# Patient Record
Sex: FEMALE | Race: WHITE | NOT HISPANIC OR LATINO | Employment: OTHER | URBAN - METROPOLITAN AREA
[De-identification: names, ages, dates, MRNs, and addresses within clinical notes are randomized per-mention and may not be internally consistent; named-entity substitution may affect disease eponyms.]

---

## 2023-06-15 DIAGNOSIS — E03.9 HYPOTHYROIDISM, UNSPECIFIED: ICD-10-CM

## 2023-06-15 DIAGNOSIS — I10 ESSENTIAL (PRIMARY) HYPERTENSION: ICD-10-CM

## 2023-06-15 DIAGNOSIS — E11.9 TYPE 2 DIABETES MELLITUS WITHOUT COMPLICATIONS (MULTI): ICD-10-CM

## 2023-06-15 PROBLEM — M18.0 PRIMARY OSTEOARTHRITIS OF BOTH FIRST CARPOMETACARPAL JOINTS: Status: ACTIVE | Noted: 2023-06-15

## 2023-06-15 PROBLEM — J30.2 SEASONAL ALLERGIES: Status: ACTIVE | Noted: 2023-06-15

## 2023-06-15 PROBLEM — I49.1 ATRIAL PREMATURE COMPLEX: Status: ACTIVE | Noted: 2023-06-15

## 2023-06-15 PROBLEM — R42 DIZZINESS: Status: ACTIVE | Noted: 2023-06-15

## 2023-06-15 PROBLEM — E83.42 HYPOMAGNESEMIA: Status: ACTIVE | Noted: 2023-06-15

## 2023-06-15 PROBLEM — R00.2 HEART PALPITATIONS: Status: ACTIVE | Noted: 2023-06-15

## 2023-06-15 PROBLEM — M65.342 TRIGGER RING FINGER OF LEFT HAND: Status: ACTIVE | Noted: 2023-06-15

## 2023-06-15 PROBLEM — M94.9 DISORDER OF BONE AND CARTILAGE: Status: ACTIVE | Noted: 2023-06-15

## 2023-06-15 PROBLEM — E66.9 OBESITY (BMI 30-39.9): Status: ACTIVE | Noted: 2023-06-15

## 2023-06-15 PROBLEM — G47.30 SLEEP APNEA: Status: ACTIVE | Noted: 2023-06-15

## 2023-06-15 PROBLEM — R74.8 ELEVATED LIVER ENZYMES: Status: ACTIVE | Noted: 2023-06-15

## 2023-06-15 PROBLEM — M89.9 DISORDER OF BONE AND CARTILAGE: Status: ACTIVE | Noted: 2023-06-15

## 2023-06-15 PROBLEM — M17.0 OSTEOARTHRITIS OF BOTH KNEES: Status: ACTIVE | Noted: 2023-06-15

## 2023-06-15 RX ORDER — METOPROLOL TARTRATE 50 MG/1
TABLET ORAL
Qty: 180 TABLET | Refills: 1 | Status: SHIPPED | OUTPATIENT
Start: 2023-06-15 | End: 2023-08-21 | Stop reason: SDUPTHER

## 2023-06-15 RX ORDER — LEVOTHYROXINE SODIUM 137 UG/1
1 TABLET ORAL DAILY
COMMUNITY
Start: 2017-04-06 | End: 2023-06-15 | Stop reason: SDUPTHER

## 2023-06-15 RX ORDER — GLIMEPIRIDE 1 MG/1
1 TABLET ORAL
COMMUNITY
Start: 2021-11-09 | End: 2023-06-15 | Stop reason: SDUPTHER

## 2023-06-15 RX ORDER — METOPROLOL TARTRATE 50 MG/1
1 TABLET ORAL 2 TIMES DAILY
COMMUNITY
Start: 2019-11-04 | End: 2023-06-15 | Stop reason: SDUPTHER

## 2023-06-15 RX ORDER — METFORMIN HYDROCHLORIDE 1000 MG/1
TABLET ORAL 2 TIMES DAILY
COMMUNITY
Start: 2015-06-05 | End: 2023-06-15 | Stop reason: SDUPTHER

## 2023-06-15 RX ORDER — AZELASTINE 1 MG/ML
SPRAY, METERED NASAL
COMMUNITY
End: 2023-08-21 | Stop reason: SDUPTHER

## 2023-06-15 RX ORDER — PHENOL 1.4 %
1 AEROSOL, SPRAY (ML) MUCOUS MEMBRANE DAILY
COMMUNITY
Start: 2019-04-24

## 2023-06-15 RX ORDER — LISINOPRIL AND HYDROCHLOROTHIAZIDE 12.5; 2 MG/1; MG/1
TABLET ORAL
Qty: 180 TABLET | Refills: 1 | Status: SHIPPED | OUTPATIENT
Start: 2023-06-15 | End: 2023-08-21 | Stop reason: SDUPTHER

## 2023-06-15 RX ORDER — ATORVASTATIN CALCIUM 40 MG/1
TABLET, FILM COATED ORAL
Qty: 90 TABLET | Refills: 1 | Status: SHIPPED | OUTPATIENT
Start: 2023-06-15 | End: 2023-08-21 | Stop reason: SDUPTHER

## 2023-06-15 RX ORDER — METFORMIN HYDROCHLORIDE 1000 MG/1
TABLET ORAL
Qty: 180 TABLET | Refills: 1 | Status: SHIPPED | OUTPATIENT
Start: 2023-06-15 | End: 2023-08-21 | Stop reason: SDUPTHER

## 2023-06-15 RX ORDER — BLOOD SUGAR DIAGNOSTIC
STRIP MISCELLANEOUS 2 TIMES DAILY
COMMUNITY
Start: 2021-02-08 | End: 2023-08-21 | Stop reason: ALTCHOICE

## 2023-06-15 RX ORDER — GLIMEPIRIDE 1 MG/1
TABLET ORAL
Qty: 90 TABLET | Refills: 1 | Status: SHIPPED | OUTPATIENT
Start: 2023-06-15 | End: 2023-08-21 | Stop reason: SDUPTHER

## 2023-06-15 RX ORDER — LEVOTHYROXINE SODIUM 137 UG/1
TABLET ORAL
Qty: 90 TABLET | Refills: 1 | Status: SHIPPED | OUTPATIENT
Start: 2023-06-15 | End: 2023-08-21 | Stop reason: SDUPTHER

## 2023-06-15 RX ORDER — LANCETS 33 GAUGE
EACH MISCELLANEOUS
COMMUNITY
Start: 2023-05-17 | End: 2023-08-21 | Stop reason: SDUPTHER

## 2023-06-15 RX ORDER — LISINOPRIL AND HYDROCHLOROTHIAZIDE 12.5; 2 MG/1; MG/1
1 TABLET ORAL 2 TIMES DAILY
COMMUNITY
Start: 2016-08-09 | End: 2023-06-15 | Stop reason: SDUPTHER

## 2023-06-15 RX ORDER — ATORVASTATIN CALCIUM 40 MG/1
1 TABLET, FILM COATED ORAL DAILY
COMMUNITY
Start: 2019-04-24 | End: 2023-06-15 | Stop reason: SDUPTHER

## 2023-08-17 PROBLEM — U07.1 COVID-19 VIRUS DETECTED: Status: ACTIVE | Noted: 2023-08-17

## 2023-08-17 PROBLEM — R55 NEAR SYNCOPE: Status: ACTIVE | Noted: 2023-08-17

## 2023-08-17 PROBLEM — R49.0 HOARSENESS OF VOICE: Status: ACTIVE | Noted: 2023-08-17

## 2023-08-17 PROBLEM — J40 BRONCHITIS: Status: ACTIVE | Noted: 2023-08-17

## 2023-08-17 PROBLEM — E83.42 LOW SERUM MAGNESIUM LEVEL: Status: ACTIVE | Noted: 2023-08-17

## 2023-08-17 PROBLEM — E78.5 HYPERLIPIDEMIA: Status: ACTIVE | Noted: 2023-08-17

## 2023-08-17 PROBLEM — R05.9 COUGH: Status: ACTIVE | Noted: 2023-08-17

## 2023-08-17 PROBLEM — L03.012 PARONYCHIA OF FINGER OF LEFT HAND: Status: ACTIVE | Noted: 2023-08-17

## 2023-08-17 RX ORDER — DOXYCYCLINE HYCLATE 100 MG
1 TABLET ORAL 2 TIMES DAILY
COMMUNITY
Start: 2023-06-19 | End: 2023-08-21 | Stop reason: ALTCHOICE

## 2023-08-21 ENCOUNTER — OFFICE VISIT (OUTPATIENT)
Dept: PRIMARY CARE | Facility: CLINIC | Age: 68
End: 2023-08-21
Payer: MEDICARE

## 2023-08-21 VITALS
WEIGHT: 242 LBS | RESPIRATION RATE: 16 BRPM | TEMPERATURE: 97.4 F | SYSTOLIC BLOOD PRESSURE: 172 MMHG | HEART RATE: 78 BPM | DIASTOLIC BLOOD PRESSURE: 68 MMHG | BODY MASS INDEX: 40.9 KG/M2 | OXYGEN SATURATION: 99 %

## 2023-08-21 DIAGNOSIS — E11.9 TYPE 2 DIABETES MELLITUS WITHOUT COMPLICATIONS (MULTI): ICD-10-CM

## 2023-08-21 DIAGNOSIS — J30.2 SEASONAL ALLERGIES: Primary | ICD-10-CM

## 2023-08-21 DIAGNOSIS — I10 PRIMARY HYPERTENSION: ICD-10-CM

## 2023-08-21 DIAGNOSIS — E11.9 TYPE 2 DIABETES MELLITUS WITHOUT COMPLICATION, WITHOUT LONG-TERM CURRENT USE OF INSULIN (MULTI): ICD-10-CM

## 2023-08-21 DIAGNOSIS — Z12.31 BREAST CANCER SCREENING BY MAMMOGRAM: ICD-10-CM

## 2023-08-21 DIAGNOSIS — I10 ESSENTIAL (PRIMARY) HYPERTENSION: ICD-10-CM

## 2023-08-21 DIAGNOSIS — E03.9 HYPOTHYROIDISM, UNSPECIFIED: ICD-10-CM

## 2023-08-21 PROBLEM — R00.2 HEART PALPITATIONS: Status: RESOLVED | Noted: 2023-06-15 | Resolved: 2023-08-21

## 2023-08-21 PROBLEM — R49.0 HOARSENESS OF VOICE: Status: RESOLVED | Noted: 2023-08-17 | Resolved: 2023-08-21

## 2023-08-21 PROCEDURE — 1160F RVW MEDS BY RX/DR IN RCRD: CPT

## 2023-08-21 PROCEDURE — 3044F HG A1C LEVEL LT 7.0%: CPT

## 2023-08-21 PROCEDURE — 3077F SYST BP >= 140 MM HG: CPT

## 2023-08-21 PROCEDURE — 1126F AMNT PAIN NOTED NONE PRSNT: CPT

## 2023-08-21 PROCEDURE — 3078F DIAST BP <80 MM HG: CPT

## 2023-08-21 PROCEDURE — 1036F TOBACCO NON-USER: CPT

## 2023-08-21 PROCEDURE — 1159F MED LIST DOCD IN RCRD: CPT

## 2023-08-21 PROCEDURE — 99214 OFFICE O/P EST MOD 30 MIN: CPT

## 2023-08-21 RX ORDER — GLIMEPIRIDE 1 MG/1
1 TABLET ORAL
Qty: 100 TABLET | Refills: 3 | Status: SHIPPED | OUTPATIENT
Start: 2023-08-21

## 2023-08-21 RX ORDER — LISINOPRIL AND HYDROCHLOROTHIAZIDE 12.5; 2 MG/1; MG/1
1 TABLET ORAL 2 TIMES DAILY
Qty: 200 TABLET | Refills: 3 | Status: SHIPPED | OUTPATIENT
Start: 2023-08-21

## 2023-08-21 RX ORDER — METFORMIN HYDROCHLORIDE 1000 MG/1
1000 TABLET ORAL 2 TIMES DAILY
Qty: 200 TABLET | Refills: 1 | Status: SHIPPED | OUTPATIENT
Start: 2023-08-21 | End: 2024-01-21

## 2023-08-21 RX ORDER — ATORVASTATIN CALCIUM 40 MG/1
40 TABLET, FILM COATED ORAL DAILY
Qty: 90 TABLET | Refills: 1 | Status: SHIPPED | OUTPATIENT
Start: 2023-08-21 | End: 2024-01-08

## 2023-08-21 RX ORDER — METOPROLOL TARTRATE 50 MG/1
50 TABLET ORAL 2 TIMES DAILY
Qty: 200 TABLET | Refills: 3 | Status: SHIPPED | OUTPATIENT
Start: 2023-08-21

## 2023-08-21 RX ORDER — LANCETS 33 GAUGE
EACH MISCELLANEOUS
Qty: 100 EACH | Refills: 3 | Status: SHIPPED | OUTPATIENT
Start: 2023-08-21 | End: 2024-01-21

## 2023-08-21 RX ORDER — LEVOTHYROXINE SODIUM 137 UG/1
137 TABLET ORAL DAILY
Qty: 100 TABLET | Refills: 3 | Status: SHIPPED | OUTPATIENT
Start: 2023-08-21

## 2023-08-21 RX ORDER — AZELASTINE 1 MG/ML
SPRAY, METERED NASAL
Qty: 30 ML | Refills: 11 | Status: SHIPPED | OUTPATIENT
Start: 2023-08-21

## 2023-08-21 SDOH — ECONOMIC STABILITY: FOOD INSECURITY: WITHIN THE PAST 12 MONTHS, YOU WORRIED THAT YOUR FOOD WOULD RUN OUT BEFORE YOU GOT MONEY TO BUY MORE.: NEVER TRUE

## 2023-08-21 SDOH — ECONOMIC STABILITY: FOOD INSECURITY: WITHIN THE PAST 12 MONTHS, THE FOOD YOU BOUGHT JUST DIDN'T LAST AND YOU DIDN'T HAVE MONEY TO GET MORE.: NEVER TRUE

## 2023-08-21 ASSESSMENT — ENCOUNTER SYMPTOMS
NEUROLOGICAL NEGATIVE: 1
DEPRESSION: 0
LOSS OF SENSATION IN FEET: 0
GASTROINTESTINAL NEGATIVE: 1
MUSCULOSKELETAL NEGATIVE: 1
PSYCHIATRIC NEGATIVE: 1
ENDOCRINE NEGATIVE: 1
HEMATOLOGIC/LYMPHATIC NEGATIVE: 1
RESPIRATORY NEGATIVE: 1
CONSTITUTIONAL NEGATIVE: 1
EYES NEGATIVE: 1
OCCASIONAL FEELINGS OF UNSTEADINESS: 0
CARDIOVASCULAR NEGATIVE: 1

## 2023-08-21 ASSESSMENT — PATIENT HEALTH QUESTIONNAIRE - PHQ9
2. FEELING DOWN, DEPRESSED OR HOPELESS: NOT AT ALL
SUM OF ALL RESPONSES TO PHQ9 QUESTIONS 1 & 2: 0
1. LITTLE INTEREST OR PLEASURE IN DOING THINGS: NOT AT ALL

## 2023-08-21 ASSESSMENT — LIFESTYLE VARIABLES
HOW OFTEN DO YOU HAVE SIX OR MORE DRINKS ON ONE OCCASION: NEVER
AUDIT-C TOTAL SCORE: 1
HOW OFTEN DO YOU HAVE A DRINK CONTAINING ALCOHOL: MONTHLY OR LESS
HOW MANY STANDARD DRINKS CONTAINING ALCOHOL DO YOU HAVE ON A TYPICAL DAY: 1 OR 2
SKIP TO QUESTIONS 9-10: 1

## 2023-08-21 ASSESSMENT — PAIN SCALES - GENERAL: PAINLEVEL: 0-NO PAIN

## 2023-08-21 NOTE — ASSESSMENT & PLAN NOTE
Blood pressure extremely elevated today at 192/88, repeat manual reading of   Home blood pressures running 120-130's/70-80's, last checked last week     Continue lisinopril-hydrochlorothiazide 20-12.5 mg daily and metop tartrate 50 mg BID

## 2023-08-21 NOTE — PATIENT INSTRUCTIONS
Thank you for coming to see me today.  If you have any questions or concerns following our visit, please contact the office.  Phone: [phone]    Follow up with me in 6 months or sooner as needed    1)  Get fasting labwork in the next 1-2 weeks.  The lab is down the [address] from our office.     2) Please schedule a mammogram in November - please call [phone] or stop to 's office (in the lab office) on your way out today.     3) Increase regular exercise- if you're having trouble doing this, set a goal to walk twice per week for 10-15 minutes per session.  As time goes on increase days of the ) week and duration of exercise.  The American Heart Association recommends 150 minutes of moderate intensity exercise per week.

## 2023-08-21 NOTE — PROGRESS NOTES
Subjective   Patient ID: Alma Rosa Lr is a 68 y.o. female who presents for hypertension, hypothyroid and DM2 follow up.    Diet: Does her own cooking, not eating out much. Eating two meals per day (big breakfast, fruit/cheese in afternoon, meat/veggie/ small starch). No soda, 3 cups coffee in the morning with cream and splenda.   Exercise: Not getting much exercise. Does a lot of housework  Weight: Stable over the last year  Water: Drinking about 2-3 large glasses per day  Sleep: Good sleep, getting about 8-9 hours per night, using CPAP nightly  Social: , lives in a ranch home with walkout basement- no issues with stairs. 2 adult children, daughter and son-in-law live across the street, other son and daughter in law live in [place]. 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    HENT: Negative.     Eyes: Negative.    Respiratory: Negative.     Cardiovascular: Negative.    Gastrointestinal: Negative.    Endocrine: Negative.    Genitourinary: Negative.    Musculoskeletal: Negative.    Skin: Negative.    Neurological: Negative.    Hematological: Negative.    Psychiatric/Behavioral: Negative.          Current Outpatient Medications   Medication Sig Dispense Refill    multivitamin-min-iron-FA-vit K (Adults Multivitamin) 18 mg iron-400 mcg-25 mcg tablet Take 1 tablet by mouth once daily.      atorvastatin (Lipitor) 40 mg tablet Take 1 tablet (40 mg) by mouth once daily. 90 tablet 1    azelastine (Astelin) 137 mcg (0.1 %) nasal spray USE 2 SPRAYS IN EACH NOSTRIL TWICE DAILY AS NEEDED 30 mL 11    glimepiride (Amaryl) 1 mg tablet Take 1 tablet (1 mg) by mouth once daily with a meal. 100 tablet 3    levothyroxine (Synthroid, Levoxyl) 137 mcg tablet Take 1 tablet (137 mcg) by mouth once daily. as directed 100 tablet 3    lisinopriL-hydrochlorothiazide 20-12.5 mg tablet Take 1 tablet by mouth 2 times a day. 200 tablet 3    metFORMIN (Glucophage) 1,000 mg tablet Take 1 tablet  (1,000 mg) by mouth 2 times a day. 200 tablet 1    metoprolol tartrate (Lopressor) 50 mg tablet Take 1 tablet by mouth 2 times a day. 200 tablet 3    OneTouch Delica Plus Lancet 30 gauge misc TEST BLOOD SUGAR TWICE A  each 3     No current facility-administered medications for this visit.     Past Surgical History:   Procedure Laterality Date    CARPAL TUNNEL RELEASE  11/08/2016    Neuroplasty Median Nerve At Carpal Tunnel    DILATION AND CURETTAGE OF UTERUS  11/08/2016    Dilation And Curettage    OTHER SURGICAL HISTORY  02/07/2020    Root canal procedure    OTHER SURGICAL HISTORY  04/24/2019    Colonoscopy    OTHER SURGICAL HISTORY  04/24/2019    Cholecystectomy     Family History   Problem Relation Name Age of Onset    No Known Problems Mother      No Known Problems Father      Diabetes Other      Heart failure Other      Glaucoma Other      Coronary artery disease Other      Other (venous insuff.) Other        Social History     Tobacco Use    Smoking status: Never    Smokeless tobacco: Never   Vaping Use    Vaping Use: Never used   Substance Use Topics    Alcohol use: Yes    Drug use: Never        Objective     Visit Vitals  /68 (BP Location: Right arm, Patient Position: Sitting)   Pulse 78   Temp 36.3 °C (97.4 °F) (Temporal)   Resp 16   Wt 110 kg (242 lb)   SpO2 99%   BMI 40.90 kg/m²   Smoking Status Never   BSA 2.24 m²        Physical Exam  Constitutional:       Appearance: Normal appearance. She is obese.   HENT:      Head: Normocephalic and atraumatic.   Eyes:      Extraocular Movements: Extraocular movements intact.      Pupils: Pupils are equal, round, and reactive to light.   Cardiovascular:      Rate and Rhythm: Normal rate and regular rhythm.   Pulmonary:      Effort: Pulmonary effort is normal.      Breath sounds: Normal breath sounds.   Abdominal:      General: Abdomen is flat. Bowel sounds are normal.      Palpations: Abdomen is soft.   Musculoskeletal:         General: Normal range of  motion.   Neurological:      General: No focal deficit present.      Mental Status: She is alert and oriented to person, place, and time.   Psychiatric:         Mood and Affect: Mood normal.         Behavior: Behavior normal.           Assessment/Plan   Problem List Items Addressed This Visit       Diabetes mellitus (CMS/HCC)     Well controlled with A1c 6.8 in 3/2023  Continue metformin 1000mg BID and glimepiride 1mg daily         Hypertension     Blood pressure extremely elevated today at 192/88, repeat manual reading of   Home blood pressures running 120-130's/70-80's, last checked last week     Continue lisinopril-hydrochlorothiazide 20-12.5 mg daily and metop tartrate 50 mg BID         Seasonal allergies - Primary    Relevant Medications    azelastine (Astelin) 137 mcg (0.1 %) nasal spray     Other Visit Diagnoses       Type 2 diabetes mellitus without complications (CMS/HCC)        Relevant Medications    atorvastatin (Lipitor) 40 mg tablet    glimepiride (Amaryl) 1 mg tablet    metFORMIN (Glucophage) 1,000 mg tablet    OneTouch Delica Plus Lancet 30 gauge misc    Other Relevant Orders    CBC    Comprehensive Metabolic Panel    Lipid Panel    Hemoglobin A1C    Albumin , Urine Random    Follow Up In Primary Care - Established    Hypothyroidism, unspecified        Relevant Medications    levothyroxine (Synthroid, Levoxyl) 137 mcg tablet    Other Relevant Orders    TSH with reflex to Free T4 if abnormal    Follow Up In Primary Care - Established    Essential (primary) hypertension        Relevant Medications    lisinopriL-hydrochlorothiazide 20-12.5 mg tablet    metoprolol tartrate (Lopressor) 50 mg tablet    Breast cancer screening by mammogram        Relevant Orders    BI mammo bilateral screening tomosynthesis            All pertinent lab work and results were reviewed with patient.     Follow up with me in 6 months or sooner as needed    JOHN PAUL Akins-CNS

## 2023-08-30 DIAGNOSIS — E11.9 TYPE 2 DIABETES MELLITUS WITHOUT COMPLICATIONS (MULTI): ICD-10-CM

## 2023-08-31 RX ORDER — BLOOD-GLUCOSE METER
EACH MISCELLANEOUS
Qty: 100 STRIP | Refills: 5 | Status: SHIPPED | OUTPATIENT
Start: 2023-08-31

## 2023-09-08 ENCOUNTER — LAB (OUTPATIENT)
Dept: LAB | Facility: LAB | Age: 68
End: 2023-09-08
Payer: MEDICARE

## 2023-09-08 DIAGNOSIS — E11.9 TYPE 2 DIABETES MELLITUS WITHOUT COMPLICATIONS (MULTI): ICD-10-CM

## 2023-09-08 DIAGNOSIS — E03.9 HYPOTHYROIDISM, UNSPECIFIED: ICD-10-CM

## 2023-09-08 LAB
ALANINE AMINOTRANSFERASE (SGPT) (U/L) IN SER/PLAS: 50 U/L (ref 7–45)
ALBUMIN (G/DL) IN SER/PLAS: 4.3 G/DL (ref 3.4–5)
ALBUMIN (MG/L) IN URINE: 7.7 MG/L
ALBUMIN/CREATININE (UG/MG) IN URINE: 8.8 UG/MG CRT (ref 0–30)
ALKALINE PHOSPHATASE (U/L) IN SER/PLAS: 96 U/L (ref 33–136)
ANION GAP IN SER/PLAS: 11 MMOL/L (ref 10–20)
ASPARTATE AMINOTRANSFERASE (SGOT) (U/L) IN SER/PLAS: 34 U/L (ref 9–39)
BILIRUBIN TOTAL (MG/DL) IN SER/PLAS: 0.6 MG/DL (ref 0–1.2)
CALCIUM (MG/DL) IN SER/PLAS: 9.2 MG/DL (ref 8.6–10.3)
CARBON DIOXIDE, TOTAL (MMOL/L) IN SER/PLAS: 27 MMOL/L (ref 21–32)
CHLORIDE (MMOL/L) IN SER/PLAS: 102 MMOL/L (ref 98–107)
CHOLESTEROL (MG/DL) IN SER/PLAS: 130 MG/DL (ref 0–199)
CHOLESTEROL IN HDL (MG/DL) IN SER/PLAS: 47.5 MG/DL
CHOLESTEROL/HDL RATIO: 2.7
CREATININE (MG/DL) IN SER/PLAS: 0.81 MG/DL (ref 0.5–1.05)
CREATININE (MG/DL) IN URINE: 87.5 MG/DL (ref 20–320)
ERYTHROCYTE DISTRIBUTION WIDTH (RATIO) BY AUTOMATED COUNT: 12.6 % (ref 11.5–14.5)
ERYTHROCYTE MEAN CORPUSCULAR HEMOGLOBIN CONCENTRATION (G/DL) BY AUTOMATED: 33.9 G/DL (ref 32–36)
ERYTHROCYTE MEAN CORPUSCULAR VOLUME (FL) BY AUTOMATED COUNT: 99 FL (ref 80–100)
ERYTHROCYTES (10*6/UL) IN BLOOD BY AUTOMATED COUNT: 3.88 X10E12/L (ref 4–5.2)
ESTIMATED AVERAGE GLUCOSE FOR HBA1C: 157 MG/DL
GFR FEMALE: 79 ML/MIN/1.73M2
GLUCOSE (MG/DL) IN SER/PLAS: 155 MG/DL (ref 74–99)
HEMATOCRIT (%) IN BLOOD BY AUTOMATED COUNT: 38.4 % (ref 36–46)
HEMOGLOBIN (G/DL) IN BLOOD: 13 G/DL (ref 12–16)
HEMOGLOBIN A1C/HEMOGLOBIN TOTAL IN BLOOD: 7.1 %
LDL: 49 MG/DL (ref 0–99)
LEUKOCYTES (10*3/UL) IN BLOOD BY AUTOMATED COUNT: 7 X10E9/L (ref 4.4–11.3)
PLATELETS (10*3/UL) IN BLOOD AUTOMATED COUNT: 244 X10E9/L (ref 150–450)
POTASSIUM (MMOL/L) IN SER/PLAS: 4 MMOL/L (ref 3.5–5.3)
PROTEIN TOTAL: 7.2 G/DL (ref 6.4–8.2)
SODIUM (MMOL/L) IN SER/PLAS: 136 MMOL/L (ref 136–145)
THYROTROPIN (MIU/L) IN SER/PLAS BY DETECTION LIMIT <= 0.05 MIU/L: 1.81 MIU/L (ref 0.44–3.98)
TRIGLYCERIDE (MG/DL) IN SER/PLAS: 168 MG/DL (ref 0–149)
UREA NITROGEN (MG/DL) IN SER/PLAS: 20 MG/DL (ref 6–23)
VLDL: 34 MG/DL (ref 0–40)

## 2023-09-08 PROCEDURE — 85027 COMPLETE CBC AUTOMATED: CPT

## 2023-09-08 PROCEDURE — 84443 ASSAY THYROID STIM HORMONE: CPT

## 2023-09-08 PROCEDURE — 82043 UR ALBUMIN QUANTITATIVE: CPT

## 2023-09-08 PROCEDURE — 36415 COLL VENOUS BLD VENIPUNCTURE: CPT

## 2023-09-08 PROCEDURE — 80053 COMPREHEN METABOLIC PANEL: CPT

## 2023-09-08 PROCEDURE — 82570 ASSAY OF URINE CREATININE: CPT

## 2023-09-08 PROCEDURE — 80061 LIPID PANEL: CPT

## 2023-09-08 PROCEDURE — 83036 HEMOGLOBIN GLYCOSYLATED A1C: CPT

## 2023-09-19 ENCOUNTER — PATIENT MESSAGE (OUTPATIENT)
Dept: PRIMARY CARE | Facility: CLINIC | Age: 68
End: 2023-09-19
Payer: MEDICARE

## 2023-09-19 DIAGNOSIS — I10 PRIMARY HYPERTENSION: Primary | ICD-10-CM

## 2023-09-20 RX ORDER — LISINOPRIL 20 MG/1
20 TABLET ORAL DAILY
Qty: 30 TABLET | Refills: 3 | Status: SHIPPED | OUTPATIENT
Start: 2023-09-20 | End: 2023-09-22

## 2023-09-21 DIAGNOSIS — I10 PRIMARY HYPERTENSION: ICD-10-CM

## 2023-09-22 RX ORDER — LISINOPRIL 20 MG/1
TABLET ORAL
Qty: 30 TABLET | Refills: 3 | Status: SHIPPED | OUTPATIENT
Start: 2023-09-22 | End: 2024-01-21

## 2023-10-22 ENCOUNTER — TELEPHONE (OUTPATIENT)
Dept: PRIMARY CARE | Facility: CLINIC | Age: 68
End: 2023-10-22
Payer: MEDICARE

## 2023-10-22 DIAGNOSIS — U07.1 COVID-19 VIRUS INFECTION: Primary | ICD-10-CM

## 2023-10-22 NOTE — TELEPHONE ENCOUNTER
Rec'd call from on call service:   Spoke with pt, she tested positive for COVID this am; requesting Paxlovid.   We will send the Rx, paxlovid as requested below.   Adv to seek ER care if sx worsens.   Dr. Stuart

## 2023-11-13 ENCOUNTER — ANCILLARY PROCEDURE (OUTPATIENT)
Dept: RADIOLOGY | Facility: CLINIC | Age: 68
End: 2023-11-13
Payer: MEDICARE

## 2023-11-13 DIAGNOSIS — Z12.31 ENCOUNTER FOR SCREENING MAMMOGRAM FOR MALIGNANT NEOPLASM OF BREAST: ICD-10-CM

## 2023-11-13 PROCEDURE — 77063 BREAST TOMOSYNTHESIS BI: CPT

## 2023-11-13 PROCEDURE — 77067 SCR MAMMO BI INCL CAD: CPT | Mod: BILATERAL PROCEDURE | Performed by: RADIOLOGY

## 2023-11-13 PROCEDURE — 77063 BREAST TOMOSYNTHESIS BI: CPT | Mod: BILATERAL PROCEDURE | Performed by: RADIOLOGY

## 2023-11-17 ENCOUNTER — APPOINTMENT (OUTPATIENT)
Dept: PRIMARY CARE | Facility: CLINIC | Age: 68
End: 2023-11-17
Payer: MEDICARE

## 2024-01-06 DIAGNOSIS — E11.9 TYPE 2 DIABETES MELLITUS WITHOUT COMPLICATIONS (MULTI): ICD-10-CM

## 2024-01-08 RX ORDER — ALBUTEROL SULFATE 90 UG/1
2 AEROSOL, METERED RESPIRATORY (INHALATION) 4 TIMES DAILY PRN
COMMUNITY
Start: 2023-11-19 | End: 2024-03-29

## 2024-01-08 RX ORDER — HYDROCODONE BITARTRATE AND ACETAMINOPHEN 5; 325 MG/1; MG/1
1 TABLET ORAL EVERY 6 HOURS PRN
COMMUNITY
Start: 2023-12-13 | End: 2024-03-10 | Stop reason: ALTCHOICE

## 2024-01-08 RX ORDER — ATORVASTATIN CALCIUM 40 MG/1
40 TABLET, FILM COATED ORAL DAILY
Qty: 90 TABLET | Refills: 1 | Status: SHIPPED | OUTPATIENT
Start: 2024-01-08

## 2024-01-18 DIAGNOSIS — E11.9 TYPE 2 DIABETES MELLITUS WITHOUT COMPLICATIONS (MULTI): ICD-10-CM

## 2024-01-18 DIAGNOSIS — I10 PRIMARY HYPERTENSION: ICD-10-CM

## 2024-01-21 RX ORDER — METFORMIN HYDROCHLORIDE 1000 MG/1
1000 TABLET ORAL 2 TIMES DAILY
Qty: 200 TABLET | Refills: 3 | Status: SHIPPED | OUTPATIENT
Start: 2024-01-21 | End: 2024-02-26 | Stop reason: SINTOL

## 2024-01-21 RX ORDER — LISINOPRIL 20 MG/1
TABLET ORAL
Qty: 90 TABLET | Refills: 3 | Status: SHIPPED | OUTPATIENT
Start: 2024-01-21 | End: 2024-02-26 | Stop reason: ALTCHOICE

## 2024-01-21 RX ORDER — LANCETS 33 GAUGE
EACH MISCELLANEOUS
Qty: 200 EACH | Refills: 3 | Status: SHIPPED | OUTPATIENT
Start: 2024-01-21

## 2024-02-26 ENCOUNTER — OFFICE VISIT (OUTPATIENT)
Dept: PRIMARY CARE | Facility: CLINIC | Age: 69
End: 2024-02-26
Payer: MEDICARE

## 2024-02-26 VITALS
BODY MASS INDEX: 40.57 KG/M2 | HEIGHT: 65 IN | HEART RATE: 65 BPM | WEIGHT: 243.5 LBS | SYSTOLIC BLOOD PRESSURE: 149 MMHG | DIASTOLIC BLOOD PRESSURE: 82 MMHG | TEMPERATURE: 96.2 F | RESPIRATION RATE: 20 BRPM | OXYGEN SATURATION: 96 %

## 2024-02-26 DIAGNOSIS — E03.9 HYPOTHYROIDISM, UNSPECIFIED TYPE: ICD-10-CM

## 2024-02-26 DIAGNOSIS — E03.9 ACQUIRED HYPOTHYROIDISM: ICD-10-CM

## 2024-02-26 DIAGNOSIS — I10 PRIMARY HYPERTENSION: ICD-10-CM

## 2024-02-26 DIAGNOSIS — E83.42 HYPOMAGNESEMIA: ICD-10-CM

## 2024-02-26 DIAGNOSIS — E11.9 TYPE 2 DIABETES MELLITUS WITHOUT COMPLICATION, WITHOUT LONG-TERM CURRENT USE OF INSULIN (MULTI): ICD-10-CM

## 2024-02-26 DIAGNOSIS — Z78.0 POST-MENOPAUSAL: ICD-10-CM

## 2024-02-26 DIAGNOSIS — Z00.00 ENCOUNTER FOR MEDICARE ANNUAL WELLNESS EXAM: ICD-10-CM

## 2024-02-26 DIAGNOSIS — Z00.00 ROUTINE GENERAL MEDICAL EXAMINATION AT HEALTH CARE FACILITY: Primary | ICD-10-CM

## 2024-02-26 PROBLEM — R05.9 COUGH: Status: RESOLVED | Noted: 2023-08-17 | Resolved: 2024-02-26

## 2024-02-26 PROBLEM — R42 DIZZINESS: Status: RESOLVED | Noted: 2023-06-15 | Resolved: 2024-02-26

## 2024-02-26 PROBLEM — U07.1 COVID-19 VIRUS DETECTED: Status: RESOLVED | Noted: 2023-08-17 | Resolved: 2024-02-26

## 2024-02-26 PROBLEM — J40 BRONCHITIS: Status: RESOLVED | Noted: 2023-08-17 | Resolved: 2024-02-26

## 2024-02-26 PROBLEM — R55 NEAR SYNCOPE: Status: RESOLVED | Noted: 2023-08-17 | Resolved: 2024-02-26

## 2024-02-26 PROCEDURE — 1123F ACP DISCUSS/DSCN MKR DOCD: CPT

## 2024-02-26 PROCEDURE — 3077F SYST BP >= 140 MM HG: CPT

## 2024-02-26 PROCEDURE — 3078F DIAST BP <80 MM HG: CPT

## 2024-02-26 PROCEDURE — 1036F TOBACCO NON-USER: CPT

## 2024-02-26 PROCEDURE — 1126F AMNT PAIN NOTED NONE PRSNT: CPT

## 2024-02-26 PROCEDURE — 99214 OFFICE O/P EST MOD 30 MIN: CPT

## 2024-02-26 PROCEDURE — G0439 PPPS, SUBSEQ VISIT: HCPCS

## 2024-02-26 PROCEDURE — 99397 PER PM REEVAL EST PAT 65+ YR: CPT

## 2024-02-26 PROCEDURE — 1160F RVW MEDS BY RX/DR IN RCRD: CPT

## 2024-02-26 PROCEDURE — 1170F FXNL STATUS ASSESSED: CPT

## 2024-02-26 PROCEDURE — 1159F MED LIST DOCD IN RCRD: CPT

## 2024-02-26 RX ORDER — CALCIUM CARBONATE 300MG(750)
400 TABLET,CHEWABLE ORAL DAILY
COMMUNITY

## 2024-02-26 RX ORDER — METFORMIN HYDROCHLORIDE 500 MG/1
1000 TABLET, EXTENDED RELEASE ORAL
Qty: 360 TABLET | Refills: 3 | Status: SHIPPED | OUTPATIENT
Start: 2024-02-26 | End: 2025-02-25

## 2024-02-26 RX ORDER — AMLODIPINE BESYLATE 2.5 MG/1
2.5 TABLET ORAL DAILY
Qty: 30 TABLET | Refills: 5 | Status: SHIPPED | OUTPATIENT
Start: 2024-02-26 | End: 2024-04-02

## 2024-02-26 SDOH — ECONOMIC STABILITY: FOOD INSECURITY: WITHIN THE PAST 12 MONTHS, YOU WORRIED THAT YOUR FOOD WOULD RUN OUT BEFORE YOU GOT MONEY TO BUY MORE.: NEVER TRUE

## 2024-02-26 SDOH — ECONOMIC STABILITY: FOOD INSECURITY: WITHIN THE PAST 12 MONTHS, THE FOOD YOU BOUGHT JUST DIDN'T LAST AND YOU DIDN'T HAVE MONEY TO GET MORE.: NEVER TRUE

## 2024-02-26 ASSESSMENT — PATIENT HEALTH QUESTIONNAIRE - PHQ9
SUM OF ALL RESPONSES TO PHQ9 QUESTIONS 1 & 2: 0
2. FEELING DOWN, DEPRESSED OR HOPELESS: NOT AT ALL
1. LITTLE INTEREST OR PLEASURE IN DOING THINGS: NOT AT ALL

## 2024-02-26 ASSESSMENT — ACTIVITIES OF DAILY LIVING (ADL)
DOING_HOUSEWORK: INDEPENDENT
MANAGING_FINANCES: INDEPENDENT
TAKING_MEDICATION: INDEPENDENT
DRESSING: INDEPENDENT
GROCERY_SHOPPING: INDEPENDENT
BATHING: INDEPENDENT

## 2024-02-26 ASSESSMENT — LIFESTYLE VARIABLES
SKIP TO QUESTIONS 9-10: 1
HOW OFTEN DO YOU HAVE A DRINK CONTAINING ALCOHOL: 2-3 TIMES A WEEK
HOW MANY STANDARD DRINKS CONTAINING ALCOHOL DO YOU HAVE ON A TYPICAL DAY: PATIENT DOES NOT DRINK
HOW OFTEN DO YOU HAVE SIX OR MORE DRINKS ON ONE OCCASION: NEVER
AUDIT-C TOTAL SCORE: 3

## 2024-02-26 ASSESSMENT — ENCOUNTER SYMPTOMS
CARDIOVASCULAR NEGATIVE: 1
MUSCULOSKELETAL NEGATIVE: 1
CONSTITUTIONAL NEGATIVE: 1
LOSS OF SENSATION IN FEET: 0
RESPIRATORY NEGATIVE: 1
NEUROLOGICAL NEGATIVE: 1
OCCASIONAL FEELINGS OF UNSTEADINESS: 0
DEPRESSION: 0
ENDOCRINE NEGATIVE: 1
HEMATOLOGIC/LYMPHATIC NEGATIVE: 1
EYES NEGATIVE: 1
GASTROINTESTINAL NEGATIVE: 1
PSYCHIATRIC NEGATIVE: 1

## 2024-02-26 ASSESSMENT — PAIN SCALES - GENERAL: PAINLEVEL: 0-NO PAIN

## 2024-02-26 NOTE — PATIENT INSTRUCTIONS
Thank you for coming to see me today.  If you have any questions or concerns following our visit, please contact the office.  Phone: [phone]    Follow up with me in 4 months or sooner as needed    1)  Get fasting labwork in the next 1-2 weeks and again a few days prior to next appointment.  The lab is down the [address] from our office.     2) Please schedule a bone density scan - please call [phone] or schedule at  on your way out today     3) START dulaglutide 0.75mg weekly. If cost is an issue please let me know and I will refer you to clinical pharmacy for cost issues    4) STOP lisinopril 20mg daily. START amlodipine 2.5mg daily. If you notice worsening leg swelling please stop medication and let me know

## 2024-02-26 NOTE — ASSESSMENT & PLAN NOTE
Hypertensive in office at 158/72  Not checking BP's at home often    Was taking lisinopril 20mg daily in addition to lisinopril-hydrochlorothiazide 20-12.5mg twice daily.  Stop lisinopril 20mg daily, continue lisinopril-hydrochlorothiazide 20-12.5mg twice daily  Start amlodipine 2.5mg daily  Advised checking blood pressures at home, record the values and bring to next office visit

## 2024-02-26 NOTE — ASSESSMENT & PLAN NOTE
A1c from 9/2023 elevated at 7.1%  Reports persistently elevated fasting glucose readings ranging 130-150    Having diarrhea in the mornings with metformin IR. Switch to metformin XR 1000mg twice daily  Continue glimepiride 1mg daily; start dulaglutide 0.75mg weekly

## 2024-02-26 NOTE — ASSESSMENT & PLAN NOTE
Wellness screenings/Immunizations:  Flu vaccination: Recommended annually  PCV: 1/2017  PPSV: 2/2022  Shingrix vaccine: Series complete  Colon cancer screening: Cologuard 3/2022  Mammogram: 11/2023, WNL  DEXA scan: Repeat scan recommended, last scan normal in 3/2022

## 2024-02-26 NOTE — PROGRESS NOTES
Subjective   Patient ID: Alma Rosa Lr is a 69 y.o. female who presents for medicare wellness visit and follow up of HTN, DM2 and hypothyroidism.    Reports home glucose readings have been persistently elevated, has had several corticosteroids orally and injected but none for the last few weeks. Reports fasting sugars are running 130-150.     Diet: Does her own cooking, not eating out much. Eating two meals per day (big breakfast, fruit/cheese in afternoon, meat/veggie/ small starch). No soda, 3 cups coffee in the morning with cream and splenda.   Exercise: Started Silver Sneakers a few weeks ago, combination of cardio and lifting or 20 minute cardios daily, exercising 5-6 days per week. Does a lot of housework  Weight: Stable over the last year  Water: Drinking about 2-3 large glasses per day  Sleep: Good sleep, getting about 8-9 hours per night, using CPAP nightly  Social: , lives in a ranch home with walkout basement- no issues with stairs. 2 adult children, daughter and son-in-law live across the street, other son and daughter in law live in [place]. 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    HENT: Negative.     Eyes: Negative.    Respiratory: Negative.     Cardiovascular: Negative.    Gastrointestinal: Negative.    Endocrine: Negative.    Genitourinary: Negative.    Musculoskeletal: Negative.    Skin: Negative.    Neurological: Negative.    Hematological: Negative.    Psychiatric/Behavioral: Negative.          Current Outpatient Medications   Medication Sig Dispense Refill    atorvastatin (Lipitor) 40 mg tablet TAKE 1 TABLET BY MOUTH ONCE DAILY 90 tablet 1    azelastine (Astelin) 137 mcg (0.1 %) nasal spray USE 2 SPRAYS IN EACH NOSTRIL TWICE DAILY AS NEEDED 30 mL 11    glimepiride (Amaryl) 1 mg tablet Take 1 tablet (1 mg) by mouth once daily with a meal. 100 tablet 3    levothyroxine (Synthroid, Levoxyl) 137 mcg tablet Take 1 tablet (137 mcg) by mouth once  daily. as directed 100 tablet 3    lisinopriL-hydrochlorothiazide 20-12.5 mg tablet Take 1 tablet by mouth 2 times a day. 200 tablet 3    magnesium oxide (Mag-Ox) 400 mg tablet Take 1 tablet (400 mg) by mouth once daily.      metoprolol tartrate (Lopressor) 50 mg tablet Take 1 tablet by mouth 2 times a day. 200 tablet 3    multivitamin-min-iron-FA-vit K (Adults Multivitamin) 18 mg iron-400 mcg-25 mcg tablet Take 1 tablet by mouth once daily.      OneTouch Delica Plus Lancet 30 gauge misc Use to test blood sugar TWICE DAILY 200 each 3    OneTouch Verio test strips strip test twice daily AS DIRECTED 100 strip 5    albuterol 90 mcg/actuation inhaler Inhale 2 puffs 4 times a day as needed for wheezing or shortness of breath.      amLODIPine (Norvasc) 2.5 mg tablet Take 1 tablet (2.5 mg) by mouth once daily. 30 tablet 5    dulaglutide (Trulicity) 0.75 mg/0.5 mL pen injector Inject 0.75 mg under the skin 1 (one) time per week. 2 mL 11    HYDROcodone-acetaminophen (Norco) 5-325 mg tablet Take 1 tablet by mouth every 6 hours if needed for moderate pain (4 - 6) or severe pain (7 - 10).      metFORMIN XR (Glucophage-XR) 500 mg 24 hr tablet Take 2 tablets (1,000 mg) by mouth 2 times a day with meals. Do not crush, chew, or split. 360 tablet 3     No current facility-administered medications for this visit.     Past Surgical History:   Procedure Laterality Date    CARPAL TUNNEL RELEASE  11/08/2016    Neuroplasty Median Nerve At Carpal Tunnel    DILATION AND CURETTAGE OF UTERUS  11/08/2016    Dilation And Curettage    OTHER SURGICAL HISTORY  02/07/2020    Root canal procedure    OTHER SURGICAL HISTORY  04/24/2019    Colonoscopy    OTHER SURGICAL HISTORY  04/24/2019    Cholecystectomy     Family History   Problem Relation Name Age of Onset    No Known Problems Mother      No Known Problems Father      Diabetes Other      Heart failure Other      Glaucoma Other      Coronary artery disease Other      Other (venous insuff.) Other    "     Social History     Tobacco Use    Smoking status: Never     Passive exposure: Never    Smokeless tobacco: Never   Vaping Use    Vaping Use: Never used   Substance Use Topics    Alcohol use: Yes     Alcohol/week: 4.0 standard drinks of alcohol     Types: 4 Glasses of wine per week    Drug use: Never        Objective     Visit Vitals  /82 (BP Location: Right arm, Patient Position: Sitting)   Pulse 65   Temp 35.7 °C (96.2 °F)   Resp 20   Ht 1.638 m (5' 4.5\")   Wt 110 kg (243 lb 8 oz)   SpO2 96%   BMI 41.15 kg/m²   OB Status Postmenopausal   Smoking Status Never   BSA 2.24 m²        Physical Exam  Constitutional:       Appearance: Normal appearance. She is obese.   HENT:      Head: Normocephalic and atraumatic.   Eyes:      Extraocular Movements: Extraocular movements intact.      Pupils: Pupils are equal, round, and reactive to light.   Cardiovascular:      Rate and Rhythm: Normal rate and regular rhythm.   Pulmonary:      Effort: Pulmonary effort is normal.      Breath sounds: Normal breath sounds.   Abdominal:      General: Abdomen is flat. Bowel sounds are normal.      Palpations: Abdomen is soft.   Musculoskeletal:         General: Normal range of motion.   Skin:     General: Skin is warm and dry.      Capillary Refill: Capillary refill takes less than 2 seconds.   Neurological:      General: No focal deficit present.      Mental Status: She is alert and oriented to person, place, and time.   Psychiatric:         Mood and Affect: Mood normal.         Behavior: Behavior normal.           Assessment/Plan   Problem List Items Addressed This Visit       Diabetes mellitus (CMS/[facility])     A1c from 9/2023 elevated at 7.1%  Reports persistently elevated fasting glucose readings ranging 130-150    Having diarrhea in the mornings with metformin IR. Switch to metformin XR 1000mg twice daily  Continue glimepiride 1mg daily; start dulaglutide 0.75mg weekly         Relevant Medications    dulaglutide (Trulicity) 0.75 " mg/0.5 mL pen injector    metFORMIN XR (Glucophage-XR) 500 mg 24 hr tablet    Other Relevant Orders    Hemoglobin A1C    Lipid Panel    Basic Metabolic Panel    Albumin , Urine Random    Hemoglobin A1C    CBC    Comprehensive Metabolic Panel    Lipid Panel    Follow Up In Primary Care - Established    Hypomagnesemia    Relevant Orders    Magnesium    Acquired hypothyroidism     TSH from 9/2023 WNL    Repeat TSH w/ reflex to T4  Continue levothyroxine 137mcg daily         Hypertension     Hypertensive in office at 158/72  Not checking BP's at home often    Was taking lisinopril 20mg daily in addition to lisinopril-hydrochlorothiazide 20-12.5mg twice daily.  Stop lisinopril 20mg daily, continue lisinopril-hydrochlorothiazide 20-12.5mg twice daily  Start amlodipine 2.5mg daily  Advised checking blood pressures at home, record the values and bring to next office visit           Relevant Medications    amLODIPine (Norvasc) 2.5 mg tablet    Other Relevant Orders    Follow Up In Primary Care - Established    Encounter for Medicare annual wellness exam     Wellness screenings/Immunizations:  Flu vaccination: Recommended annually  PCV: 1/2017  PPSV: 2/2022  Shingrix vaccine: Series complete  Colon cancer screening: Cologuard 3/2022  Mammogram: 11/2023, WNL  DEXA scan: Repeat scan recommended, last scan normal in 3/2022            Other Visit Diagnoses       Routine general medical examination at health care facility    -  Primary    Post-menopausal        Relevant Orders    XR DEXA bone density            All pertinent lab work and results were reviewed with patient.     Follow up with me in 4 months    Christi Mitchell, JOHN PAUL-CNS

## 2024-03-06 ENCOUNTER — HOSPITAL ENCOUNTER (OUTPATIENT)
Dept: RADIOLOGY | Facility: CLINIC | Age: 69
Discharge: HOME | End: 2024-03-06
Payer: MEDICARE

## 2024-03-06 DIAGNOSIS — Z78.0 POST-MENOPAUSAL: ICD-10-CM

## 2024-03-06 PROCEDURE — 77080 DXA BONE DENSITY AXIAL: CPT

## 2024-03-06 PROCEDURE — 77080 DXA BONE DENSITY AXIAL: CPT | Performed by: RADIOLOGY

## 2024-03-08 ENCOUNTER — LAB (OUTPATIENT)
Dept: LAB | Facility: LAB | Age: 69
End: 2024-03-08
Payer: MEDICARE

## 2024-03-08 DIAGNOSIS — E83.42 HYPOMAGNESEMIA: ICD-10-CM

## 2024-03-08 DIAGNOSIS — E03.9 HYPOTHYROIDISM, UNSPECIFIED TYPE: ICD-10-CM

## 2024-03-08 DIAGNOSIS — E11.9 TYPE 2 DIABETES MELLITUS WITHOUT COMPLICATION, WITHOUT LONG-TERM CURRENT USE OF INSULIN (MULTI): ICD-10-CM

## 2024-03-08 LAB
ANION GAP SERPL CALC-SCNC: 13 MMOL/L (ref 10–20)
BUN SERPL-MCNC: 20 MG/DL (ref 6–23)
CALCIUM SERPL-MCNC: 9.6 MG/DL (ref 8.6–10.3)
CHLORIDE SERPL-SCNC: 100 MMOL/L (ref 98–107)
CHOLEST SERPL-MCNC: 132 MG/DL (ref 0–199)
CHOLESTEROL/HDL RATIO: 2.4
CO2 SERPL-SCNC: 28 MMOL/L (ref 21–32)
CREAT SERPL-MCNC: 0.89 MG/DL (ref 0.5–1.05)
EGFRCR SERPLBLD CKD-EPI 2021: 71 ML/MIN/1.73M*2
EST. AVERAGE GLUCOSE BLD GHB EST-MCNC: 174 MG/DL
GLUCOSE SERPL-MCNC: 127 MG/DL (ref 74–99)
HBA1C MFR BLD: 7.7 %
HDLC SERPL-MCNC: 54.9 MG/DL
LDLC SERPL CALC-MCNC: 55 MG/DL
MAGNESIUM SERPL-MCNC: 1.77 MG/DL (ref 1.6–2.4)
NON HDL CHOLESTEROL: 77 MG/DL (ref 0–149)
POTASSIUM SERPL-SCNC: 4.2 MMOL/L (ref 3.5–5.3)
SODIUM SERPL-SCNC: 137 MMOL/L (ref 136–145)
TRIGL SERPL-MCNC: 113 MG/DL (ref 0–149)
TSH SERPL-ACNC: 1.61 MIU/L (ref 0.44–3.98)
VLDL: 23 MG/DL (ref 0–40)

## 2024-03-08 PROCEDURE — 83735 ASSAY OF MAGNESIUM: CPT

## 2024-03-08 PROCEDURE — 84443 ASSAY THYROID STIM HORMONE: CPT

## 2024-03-08 PROCEDURE — 83036 HEMOGLOBIN GLYCOSYLATED A1C: CPT

## 2024-03-08 PROCEDURE — 80048 BASIC METABOLIC PNL TOTAL CA: CPT

## 2024-03-08 PROCEDURE — 80061 LIPID PANEL: CPT

## 2024-03-08 PROCEDURE — 36415 COLL VENOUS BLD VENIPUNCTURE: CPT

## 2024-03-10 DIAGNOSIS — Z13.29 SCREENING FOR THYROID DISORDER: ICD-10-CM

## 2024-03-10 DIAGNOSIS — E11.9 TYPE 2 DIABETES MELLITUS WITHOUT COMPLICATION, WITHOUT LONG-TERM CURRENT USE OF INSULIN (MULTI): Primary | ICD-10-CM

## 2024-03-29 DIAGNOSIS — I10 PRIMARY HYPERTENSION: ICD-10-CM

## 2024-04-01 ENCOUNTER — APPOINTMENT (OUTPATIENT)
Dept: RADIOLOGY | Facility: CLINIC | Age: 69
End: 2024-04-01
Payer: MEDICARE

## 2024-04-02 RX ORDER — AMLODIPINE BESYLATE 2.5 MG/1
2.5 TABLET ORAL DAILY
Qty: 30 TABLET | Refills: 5 | Status: SHIPPED | OUTPATIENT
Start: 2024-04-02 | End: 2024-09-29

## 2024-06-19 ENCOUNTER — LAB (OUTPATIENT)
Dept: LAB | Facility: LAB | Age: 69
End: 2024-06-19
Payer: MEDICARE

## 2024-06-19 DIAGNOSIS — E11.9 TYPE 2 DIABETES MELLITUS WITHOUT COMPLICATION, WITHOUT LONG-TERM CURRENT USE OF INSULIN (MULTI): ICD-10-CM

## 2024-06-19 DIAGNOSIS — Z13.29 SCREENING FOR THYROID DISORDER: ICD-10-CM

## 2024-06-19 DIAGNOSIS — E83.42 HYPOMAGNESEMIA: ICD-10-CM

## 2024-06-19 DIAGNOSIS — E03.9 HYPOTHYROIDISM, UNSPECIFIED TYPE: ICD-10-CM

## 2024-06-19 LAB
ALBUMIN SERPL BCP-MCNC: 4.2 G/DL (ref 3.4–5)
ALP SERPL-CCNC: 98 U/L (ref 33–136)
ALT SERPL W P-5'-P-CCNC: 64 U/L (ref 7–45)
ANION GAP SERPL CALC-SCNC: 12 MMOL/L (ref 10–20)
AST SERPL W P-5'-P-CCNC: 37 U/L (ref 9–39)
BILIRUB SERPL-MCNC: 0.6 MG/DL (ref 0–1.2)
BUN SERPL-MCNC: 17 MG/DL (ref 6–23)
CALCIUM SERPL-MCNC: 9.2 MG/DL (ref 8.6–10.3)
CHLORIDE SERPL-SCNC: 102 MMOL/L (ref 98–107)
CHOLEST SERPL-MCNC: 134 MG/DL (ref 0–199)
CHOLESTEROL/HDL RATIO: 2.5
CO2 SERPL-SCNC: 26 MMOL/L (ref 21–32)
CREAT SERPL-MCNC: 0.83 MG/DL (ref 0.5–1.05)
CREAT UR-MCNC: 113.5 MG/DL (ref 20–320)
EGFRCR SERPLBLD CKD-EPI 2021: 77 ML/MIN/1.73M*2
ERYTHROCYTE [DISTWIDTH] IN BLOOD BY AUTOMATED COUNT: 13 % (ref 11.5–14.5)
EST. AVERAGE GLUCOSE BLD GHB EST-MCNC: 151 MG/DL
GLUCOSE SERPL-MCNC: 142 MG/DL (ref 74–99)
HBA1C MFR BLD: 6.9 %
HCT VFR BLD AUTO: 41.2 % (ref 36–46)
HDLC SERPL-MCNC: 53 MG/DL
HGB BLD-MCNC: 13.1 G/DL (ref 12–16)
LDLC SERPL CALC-MCNC: 48 MG/DL
MCH RBC QN AUTO: 32.6 PG (ref 26–34)
MCHC RBC AUTO-ENTMCNC: 31.8 G/DL (ref 32–36)
MCV RBC AUTO: 103 FL (ref 80–100)
MICROALBUMIN UR-MCNC: 12.1 MG/L
MICROALBUMIN/CREAT UR: 10.7 UG/MG CREAT
NON HDL CHOLESTEROL: 81 MG/DL (ref 0–149)
NRBC BLD-RTO: 0 /100 WBCS (ref 0–0)
PLATELET # BLD AUTO: 238 X10*3/UL (ref 150–450)
POTASSIUM SERPL-SCNC: 4.1 MMOL/L (ref 3.5–5.3)
PROT SERPL-MCNC: 7.1 G/DL (ref 6.4–8.2)
RBC # BLD AUTO: 4.02 X10*6/UL (ref 4–5.2)
SODIUM SERPL-SCNC: 136 MMOL/L (ref 136–145)
TRIGL SERPL-MCNC: 163 MG/DL (ref 0–149)
TSH SERPL-ACNC: 1.28 MIU/L (ref 0.44–3.98)
VLDL: 33 MG/DL (ref 0–40)
WBC # BLD AUTO: 6.2 X10*3/UL (ref 4.4–11.3)

## 2024-06-19 PROCEDURE — 82570 ASSAY OF URINE CREATININE: CPT

## 2024-06-19 PROCEDURE — 80053 COMPREHEN METABOLIC PANEL: CPT

## 2024-06-19 PROCEDURE — 82043 UR ALBUMIN QUANTITATIVE: CPT

## 2024-06-19 PROCEDURE — 83036 HEMOGLOBIN GLYCOSYLATED A1C: CPT

## 2024-06-19 PROCEDURE — 80061 LIPID PANEL: CPT

## 2024-06-19 PROCEDURE — 85027 COMPLETE CBC AUTOMATED: CPT

## 2024-06-19 PROCEDURE — 84443 ASSAY THYROID STIM HORMONE: CPT

## 2024-06-19 PROCEDURE — 36415 COLL VENOUS BLD VENIPUNCTURE: CPT

## 2024-06-25 ENCOUNTER — APPOINTMENT (OUTPATIENT)
Dept: PRIMARY CARE | Facility: CLINIC | Age: 69
End: 2024-06-25
Payer: MEDICARE

## 2024-06-25 VITALS
HEIGHT: 65 IN | TEMPERATURE: 96.6 F | OXYGEN SATURATION: 95 % | DIASTOLIC BLOOD PRESSURE: 70 MMHG | HEART RATE: 64 BPM | SYSTOLIC BLOOD PRESSURE: 148 MMHG | BODY MASS INDEX: 41.25 KG/M2 | WEIGHT: 247.6 LBS | RESPIRATION RATE: 20 BRPM

## 2024-06-25 DIAGNOSIS — D50.9 IRON DEFICIENCY ANEMIA, UNSPECIFIED IRON DEFICIENCY ANEMIA TYPE: ICD-10-CM

## 2024-06-25 DIAGNOSIS — E11.9 TYPE 2 DIABETES MELLITUS WITHOUT COMPLICATION, WITHOUT LONG-TERM CURRENT USE OF INSULIN (MULTI): ICD-10-CM

## 2024-06-25 DIAGNOSIS — E03.9 ACQUIRED HYPOTHYROIDISM: ICD-10-CM

## 2024-06-25 DIAGNOSIS — I10 PRIMARY HYPERTENSION: ICD-10-CM

## 2024-06-25 PROCEDURE — 3061F NEG MICROALBUMINURIA REV: CPT

## 2024-06-25 PROCEDURE — 3048F LDL-C <100 MG/DL: CPT

## 2024-06-25 PROCEDURE — 3077F SYST BP >= 140 MM HG: CPT

## 2024-06-25 PROCEDURE — 1159F MED LIST DOCD IN RCRD: CPT

## 2024-06-25 PROCEDURE — 99214 OFFICE O/P EST MOD 30 MIN: CPT

## 2024-06-25 PROCEDURE — 1160F RVW MEDS BY RX/DR IN RCRD: CPT

## 2024-06-25 PROCEDURE — 1126F AMNT PAIN NOTED NONE PRSNT: CPT

## 2024-06-25 PROCEDURE — 1123F ACP DISCUSS/DSCN MKR DOCD: CPT

## 2024-06-25 PROCEDURE — 1036F TOBACCO NON-USER: CPT

## 2024-06-25 PROCEDURE — 3078F DIAST BP <80 MM HG: CPT

## 2024-06-25 PROCEDURE — 3044F HG A1C LEVEL LT 7.0%: CPT

## 2024-06-25 PROCEDURE — 3008F BODY MASS INDEX DOCD: CPT

## 2024-06-25 RX ORDER — DOXYCYCLINE HYCLATE 100 MG
100 TABLET ORAL EVERY 12 HOURS
COMMUNITY
Start: 2023-06-19

## 2024-06-25 RX ORDER — LISINOPRIL 20 MG/1
20 TABLET ORAL DAILY
COMMUNITY
Start: 2024-03-30 | End: 2024-06-25 | Stop reason: ALTCHOICE

## 2024-06-25 RX ORDER — AMLODIPINE BESYLATE 5 MG/1
5 TABLET ORAL DAILY
Qty: 30 TABLET | Refills: 5 | Status: SHIPPED | OUTPATIENT
Start: 2024-06-25 | End: 2024-12-22

## 2024-06-25 SDOH — ECONOMIC STABILITY: FOOD INSECURITY: WITHIN THE PAST 12 MONTHS, YOU WORRIED THAT YOUR FOOD WOULD RUN OUT BEFORE YOU GOT MONEY TO BUY MORE.: NEVER TRUE

## 2024-06-25 SDOH — ECONOMIC STABILITY: FOOD INSECURITY: WITHIN THE PAST 12 MONTHS, THE FOOD YOU BOUGHT JUST DIDN'T LAST AND YOU DIDN'T HAVE MONEY TO GET MORE.: NEVER TRUE

## 2024-06-25 ASSESSMENT — ENCOUNTER SYMPTOMS
MUSCULOSKELETAL NEGATIVE: 1
NEUROLOGICAL NEGATIVE: 1
CARDIOVASCULAR NEGATIVE: 1
HEMATOLOGIC/LYMPHATIC NEGATIVE: 1
CONSTITUTIONAL NEGATIVE: 1
LOSS OF SENSATION IN FEET: 0
PSYCHIATRIC NEGATIVE: 1
EYES NEGATIVE: 1
DEPRESSION: 0
OCCASIONAL FEELINGS OF UNSTEADINESS: 0
RESPIRATORY NEGATIVE: 1
ENDOCRINE NEGATIVE: 1
GASTROINTESTINAL NEGATIVE: 1

## 2024-06-25 ASSESSMENT — PATIENT HEALTH QUESTIONNAIRE - PHQ9
1. LITTLE INTEREST OR PLEASURE IN DOING THINGS: NOT AT ALL
SUM OF ALL RESPONSES TO PHQ9 QUESTIONS 1 & 2: 0
2. FEELING DOWN, DEPRESSED OR HOPELESS: NOT AT ALL

## 2024-06-25 ASSESSMENT — LIFESTYLE VARIABLES
HOW OFTEN DO YOU HAVE SIX OR MORE DRINKS ON ONE OCCASION: NEVER
HOW OFTEN DO YOU HAVE A DRINK CONTAINING ALCOHOL: NEVER
SKIP TO QUESTIONS 9-10: 1
HOW MANY STANDARD DRINKS CONTAINING ALCOHOL DO YOU HAVE ON A TYPICAL DAY: PATIENT DOES NOT DRINK
AUDIT-C TOTAL SCORE: 0

## 2024-06-25 ASSESSMENT — ANXIETY QUESTIONNAIRES
3. WORRYING TOO MUCH ABOUT DIFFERENT THINGS: NOT AT ALL
2. NOT BEING ABLE TO STOP OR CONTROL WORRYING: NOT AT ALL
4. TROUBLE RELAXING: NOT AT ALL
6. BECOMING EASILY ANNOYED OR IRRITABLE: NOT AT ALL
IF YOU CHECKED OFF ANY PROBLEMS ON THIS QUESTIONNAIRE, HOW DIFFICULT HAVE THESE PROBLEMS MADE IT FOR YOU TO DO YOUR WORK, TAKE CARE OF THINGS AT HOME, OR GET ALONG WITH OTHER PEOPLE: NOT DIFFICULT AT ALL
GAD7 TOTAL SCORE: 0
7. FEELING AFRAID AS IF SOMETHING AWFUL MIGHT HAPPEN: NOT AT ALL
1. FEELING NERVOUS, ANXIOUS, OR ON EDGE: NOT AT ALL
5. BEING SO RESTLESS THAT IT IS HARD TO SIT STILL: NOT AT ALL

## 2024-06-25 ASSESSMENT — PAIN SCALES - GENERAL: PAINLEVEL: 0-NO PAIN

## 2024-06-25 NOTE — ASSESSMENT & PLAN NOTE
BMI elevated at 41.84 kg/m^2 in office today  Discussed dietary changes such as decreased calories, increased intake of lean meat, fruits and veggies and increased exercise as tolerated

## 2024-06-25 NOTE — ASSESSMENT & PLAN NOTE
A1c improved to 6.9% on labs from 6/2024  Concerned that she's having higher sugars toward the end of the week when dulaglutide in lower concentrations. Interested in starting ozempic for weight loss side effect profile    Stop dulaglutide; start ozempic 0.25mg weekly; continue metformin XR 1000mg twice daily. Consider weaning off in the future due to persistent diarrhea despite XR formulation change. Could add in SGLT2i. Did have normal urine albumin on 6/2024.  Continue glimeperide 1 mg

## 2024-06-25 NOTE — ASSESSMENT & PLAN NOTE
Hypertensive in office at 148/70  Home blood pressures checked infrequently since last visit ranging between 120-140/60-80s    Continue lisinopril-hydrochlorothiazide 20-12.5mg BID; increase amlodipine to 5mg daily

## 2024-06-25 NOTE — PATIENT INSTRUCTIONS
Thank you for coming to see me today.  If you have any questions or concerns following our visit, please contact the office.  Phone: [phone]    Follow up with me in 3 months or sooner as needed    1)  STOP dulaglutide; START Ozempic 0.25mg weekly    2) INCREASE amlodipine to 5mg daily    3) Get non-fasting labwork a few days prior to next visit.  The lab is down the [address] from our office.

## 2024-06-25 NOTE — PROGRESS NOTES
Subjective   Patient ID: Alma Rosa Lr is a 69 y.o. female who presents for follow up of HTN, DM2.    Switched to metformin XR at last visit, started on trulicity 0.75mg weekly  Switched to lisinopril-hydrochlorothiazide 20-12.5mg BID and started on amlodipine 2.5mg daily. Doing well on this, home blood pressures checked sporadically running 120-140/60-80s.    Checking blood sugars in the afternoon and tends to go lower in the afternoons, notices toward the end of the week sugars are higher in the 120-140s range.     Diet: Does her own cooking, not eating out much. Eating two meals per day (big breakfast, fruit/cheese in afternoon, meat/veggie/ small starch). No soda, 3 cups coffee in the morning with cream and splenda.   Exercise: Started Silver Sneakers a few weeks ago, combination of cardio and lifting or 20 minute cardios daily, exercising 5-6 days per week. Does a lot of housework  Weight: Stable over the last year  Water: Drinking about 2-3 large glasses per day  Sleep: Good sleep, getting about 8-9 hours per night, using CPAP nightly  Social: , lives in a ranch home with walkout basement- no issues with stairs. 2 adult children, daughter and son-in-law live across the street, other son and daughter in law live in [place]. 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    HENT: Negative.     Eyes: Negative.    Respiratory: Negative.     Cardiovascular: Negative.    Gastrointestinal: Negative.    Endocrine: Negative.    Genitourinary: Negative.    Musculoskeletal: Negative.    Skin: Negative.    Neurological: Negative.    Hematological: Negative.    Psychiatric/Behavioral: Negative.          Current Outpatient Medications   Medication Sig Dispense Refill    atorvastatin (Lipitor) 40 mg tablet TAKE 1 TABLET BY MOUTH ONCE DAILY 90 tablet 1    azelastine (Astelin) 137 mcg (0.1 %) nasal spray USE 2 SPRAYS IN EACH NOSTRIL TWICE DAILY AS NEEDED 30 mL 11    dulaglutide  (Trulicity) 0.75 mg/0.5 mL pen injector Inject 0.75 mg under the skin 1 (one) time per week. 2 mL 11    glimepiride (Amaryl) 1 mg tablet Take 1 tablet (1 mg) by mouth once daily with a meal. 100 tablet 3    levothyroxine (Synthroid, Levoxyl) 137 mcg tablet Take 1 tablet (137 mcg) by mouth once daily. as directed 100 tablet 3    lisinopriL-hydrochlorothiazide 20-12.5 mg tablet Take 1 tablet by mouth 2 times a day. 200 tablet 3    magnesium oxide (Mag-Ox) 400 mg tablet Take 1 tablet (400 mg) by mouth once daily.      metFORMIN XR (Glucophage-XR) 500 mg 24 hr tablet Take 2 tablets (1,000 mg) by mouth 2 times a day with meals. Do not crush, chew, or split. 360 tablet 3    metoprolol tartrate (Lopressor) 50 mg tablet Take 1 tablet by mouth 2 times a day. 200 tablet 3    multivitamin-min-iron-FA-vit K (Adults Multivitamin) 18 mg iron-400 mcg-25 mcg tablet Take 1 tablet by mouth once daily.      OneTouch Delica Plus Lancet 30 gauge misc Use to test blood sugar TWICE DAILY 200 each 3    OneTouch Verio test strips strip test twice daily AS DIRECTED 100 strip 5    amLODIPine (Norvasc) 5 mg tablet Take 1 tablet (5 mg) by mouth once daily. 30 tablet 5    doxycycline (Vibra-Tabs) 100 mg tablet Take 1 tablet (100 mg) by mouth every 12 hours.      [START ON 6/30/2024] semaglutide 0.25 mg or 0.5 mg (2 mg/3 mL) pen injector Inject 0.25 mg under the skin 1 (one) time per week. 3 mL 5     No current facility-administered medications for this visit.     Past Surgical History:   Procedure Laterality Date    CARPAL TUNNEL RELEASE  11/08/2016    Neuroplasty Median Nerve At Carpal Tunnel    DILATION AND CURETTAGE OF UTERUS  11/08/2016    Dilation And Curettage    OTHER SURGICAL HISTORY  02/07/2020    Root canal procedure    OTHER SURGICAL HISTORY  04/24/2019    Colonoscopy    OTHER SURGICAL HISTORY  04/24/2019    Cholecystectomy     Family History   Problem Relation Name Age of Onset    No Known Problems Mother      No Known Problems  "Father      Diabetes Other      Heart failure Other      Glaucoma Other      Coronary artery disease Other      Other (venous insuff.) Other        Social History     Tobacco Use    Smoking status: Never     Passive exposure: Never    Smokeless tobacco: Never   Vaping Use    Vaping status: Never Used   Substance Use Topics    Alcohol use: Yes     Alcohol/week: 4.0 standard drinks of alcohol     Types: 4 Glasses of wine per week    Drug use: Never        Objective     Visit Vitals  /70 (BP Location: Left arm, Patient Position: Sitting, BP Cuff Size: Large adult)   Pulse 64   Temp 35.9 °C (96.6 °F)   Resp 20   Ht 1.638 m (5' 4.5\")   Wt 112 kg (247 lb 9.6 oz)   SpO2 95%   BMI 41.84 kg/m²   OB Status Postmenopausal   Smoking Status Never   BSA 2.26 m²        Physical Exam  Constitutional:       Appearance: Normal appearance. She is obese.   HENT:      Head: Normocephalic and atraumatic.   Eyes:      Extraocular Movements: Extraocular movements intact.      Pupils: Pupils are equal, round, and reactive to light.   Pulmonary:      Effort: Pulmonary effort is normal.   Abdominal:      General: There is distension.      Palpations: Abdomen is soft.   Musculoskeletal:         General: Normal range of motion.   Skin:     General: Skin is warm and dry.      Capillary Refill: Capillary refill takes less than 2 seconds.   Neurological:      General: No focal deficit present.      Mental Status: She is alert and oriented to person, place, and time.   Psychiatric:         Mood and Affect: Mood normal.         Behavior: Behavior normal.           Assessment/Plan   Problem List Items Addressed This Visit       Diabetes mellitus (Multi)     A1c improved to 6.9% on labs from 6/2024  Concerned that she's having higher sugars toward the end of the week when dulaglutide in lower concentrations. Interested in starting ozempic for weight loss side effect profile    Stop dulaglutide; start ozempic 0.25mg weekly; continue metformin XR " 1000mg twice daily. Consider weaning off in the future due to persistent diarrhea despite XR formulation change. Could add in SGLT2i. Did have normal urine albumin on 6/2024.  Continue glimeperide 1 mg          Relevant Medications    semaglutide 0.25 mg or 0.5 mg (2 mg/3 mL) pen injector (Start on 6/30/2024)    Other Relevant Orders    Basic Metabolic Panel    Hemoglobin A1C    Acquired hypothyroidism     TSH from 6/2024 in normal range at 1.28  Continue levothyroxine 137 mcg daily         Hypertension     Hypertensive in office at 148/70  Home blood pressures checked infrequently since last visit ranging between 120-140/60-80s    Continue lisinopril-hydrochlorothiazide 20-12.5mg BID; increase amlodipine to 5mg daily         Relevant Medications    amLODIPine (Norvasc) 5 mg tablet    Body mass index (BMI) 40.0-44.9, adult (Multi) - Primary     BMI elevated at 41.84 kg/m^2 in office today  Discussed dietary changes such as decreased calories, increased intake of lean meat, fruits and veggies and increased exercise as tolerated          Other Visit Diagnoses       Iron deficiency anemia, unspecified iron deficiency anemia type        Relevant Orders    Iron and TIBC    Ferritin            All pertinent lab work and results were reviewed with patient.     Follow up with me in 3 months    JOHN PAUL Akins-CNS

## 2024-08-25 ENCOUNTER — PATIENT MESSAGE (OUTPATIENT)
Dept: PRIMARY CARE | Facility: CLINIC | Age: 69
End: 2024-08-25
Payer: MEDICARE

## 2024-08-25 DIAGNOSIS — E83.42 HYPOMAGNESEMIA: Primary | ICD-10-CM

## 2024-09-19 ENCOUNTER — LAB (OUTPATIENT)
Dept: LAB | Facility: LAB | Age: 69
End: 2024-09-19
Payer: MEDICARE

## 2024-09-19 DIAGNOSIS — D50.9 IRON DEFICIENCY ANEMIA, UNSPECIFIED IRON DEFICIENCY ANEMIA TYPE: ICD-10-CM

## 2024-09-19 DIAGNOSIS — E11.9 TYPE 2 DIABETES MELLITUS WITHOUT COMPLICATION, WITHOUT LONG-TERM CURRENT USE OF INSULIN (MULTI): ICD-10-CM

## 2024-09-19 DIAGNOSIS — E83.42 HYPOMAGNESEMIA: ICD-10-CM

## 2024-09-19 DIAGNOSIS — E03.9 HYPOTHYROIDISM, UNSPECIFIED TYPE: ICD-10-CM

## 2024-09-19 LAB
ALBUMIN SERPL BCP-MCNC: 4.3 G/DL (ref 3.4–5)
ALP SERPL-CCNC: 102 U/L (ref 33–136)
ALT SERPL W P-5'-P-CCNC: 67 U/L (ref 7–45)
ANION GAP SERPL CALC-SCNC: 14 MMOL/L (ref 10–20)
AST SERPL W P-5'-P-CCNC: 41 U/L (ref 9–39)
BILIRUB SERPL-MCNC: 0.6 MG/DL (ref 0–1.2)
BUN SERPL-MCNC: 15 MG/DL (ref 6–23)
CALCIUM SERPL-MCNC: 9 MG/DL (ref 8.6–10.3)
CHLORIDE SERPL-SCNC: 101 MMOL/L (ref 98–107)
CHOLEST SERPL-MCNC: 125 MG/DL (ref 0–199)
CHOLESTEROL/HDL RATIO: 2.2
CO2 SERPL-SCNC: 25 MMOL/L (ref 21–32)
CREAT SERPL-MCNC: 0.84 MG/DL (ref 0.5–1.05)
EGFRCR SERPLBLD CKD-EPI 2021: 76 ML/MIN/1.73M*2
ERYTHROCYTE [DISTWIDTH] IN BLOOD BY AUTOMATED COUNT: 12.8 % (ref 11.5–14.5)
EST. AVERAGE GLUCOSE BLD GHB EST-MCNC: 151 MG/DL
FERRITIN SERPL-MCNC: 117 NG/ML (ref 8–150)
GLUCOSE SERPL-MCNC: 130 MG/DL (ref 74–99)
HBA1C MFR BLD: 6.9 %
HCT VFR BLD AUTO: 39.3 % (ref 36–46)
HDLC SERPL-MCNC: 57.7 MG/DL
HGB BLD-MCNC: 13.1 G/DL (ref 12–16)
IRON SATN MFR SERPL: 19 % (ref 25–45)
IRON SERPL-MCNC: 76 UG/DL (ref 35–150)
LDLC SERPL CALC-MCNC: 45 MG/DL
MAGNESIUM SERPL-MCNC: 1.79 MG/DL (ref 1.6–2.4)
MCH RBC QN AUTO: 32.3 PG (ref 26–34)
MCHC RBC AUTO-ENTMCNC: 33.3 G/DL (ref 32–36)
MCV RBC AUTO: 97 FL (ref 80–100)
NON HDL CHOLESTEROL: 67 MG/DL (ref 0–149)
NRBC BLD-RTO: 0 /100 WBCS (ref 0–0)
PLATELET # BLD AUTO: 247 X10*3/UL (ref 150–450)
POTASSIUM SERPL-SCNC: 4.2 MMOL/L (ref 3.5–5.3)
PROT SERPL-MCNC: 7 G/DL (ref 6.4–8.2)
RBC # BLD AUTO: 4.05 X10*6/UL (ref 4–5.2)
SODIUM SERPL-SCNC: 136 MMOL/L (ref 136–145)
TIBC SERPL-MCNC: 403 UG/DL (ref 240–445)
TRIGL SERPL-MCNC: 112 MG/DL (ref 0–149)
TSH SERPL-ACNC: 1.03 MIU/L (ref 0.44–3.98)
UIBC SERPL-MCNC: 327 UG/DL (ref 110–370)
VLDL: 22 MG/DL (ref 0–40)
WBC # BLD AUTO: 5.6 X10*3/UL (ref 4.4–11.3)

## 2024-09-19 PROCEDURE — 82728 ASSAY OF FERRITIN: CPT

## 2024-09-19 PROCEDURE — 85027 COMPLETE CBC AUTOMATED: CPT

## 2024-09-19 PROCEDURE — 80061 LIPID PANEL: CPT

## 2024-09-19 PROCEDURE — 83735 ASSAY OF MAGNESIUM: CPT

## 2024-09-19 PROCEDURE — 36415 COLL VENOUS BLD VENIPUNCTURE: CPT

## 2024-09-19 PROCEDURE — 83540 ASSAY OF IRON: CPT

## 2024-09-19 PROCEDURE — 83550 IRON BINDING TEST: CPT

## 2024-09-19 PROCEDURE — 80053 COMPREHEN METABOLIC PANEL: CPT

## 2024-09-19 PROCEDURE — 84443 ASSAY THYROID STIM HORMONE: CPT

## 2024-09-19 PROCEDURE — 83036 HEMOGLOBIN GLYCOSYLATED A1C: CPT

## 2024-09-20 DIAGNOSIS — E11.9 TYPE 2 DIABETES MELLITUS WITHOUT COMPLICATIONS (MULTI): ICD-10-CM

## 2024-09-22 RX ORDER — ATORVASTATIN CALCIUM 40 MG/1
40 TABLET, FILM COATED ORAL DAILY
Qty: 90 TABLET | Refills: 1 | Status: SHIPPED | OUTPATIENT
Start: 2024-09-22 | End: 2024-09-24 | Stop reason: SDUPTHER

## 2024-09-24 ENCOUNTER — APPOINTMENT (OUTPATIENT)
Dept: PRIMARY CARE | Facility: CLINIC | Age: 69
End: 2024-09-24
Payer: MEDICARE

## 2024-09-24 VITALS
HEART RATE: 75 BPM | TEMPERATURE: 96.6 F | BODY MASS INDEX: 40.32 KG/M2 | SYSTOLIC BLOOD PRESSURE: 137 MMHG | OXYGEN SATURATION: 94 % | WEIGHT: 242 LBS | HEIGHT: 65 IN | RESPIRATION RATE: 16 BRPM | DIASTOLIC BLOOD PRESSURE: 80 MMHG

## 2024-09-24 DIAGNOSIS — E11.9 TYPE 2 DIABETES MELLITUS WITHOUT COMPLICATIONS (MULTI): ICD-10-CM

## 2024-09-24 DIAGNOSIS — Z12.31 BREAST CANCER SCREENING BY MAMMOGRAM: ICD-10-CM

## 2024-09-24 DIAGNOSIS — I10 ESSENTIAL (PRIMARY) HYPERTENSION: ICD-10-CM

## 2024-09-24 DIAGNOSIS — J30.2 SEASONAL ALLERGIES: ICD-10-CM

## 2024-09-24 DIAGNOSIS — E03.9 HYPOTHYROIDISM, UNSPECIFIED: ICD-10-CM

## 2024-09-24 DIAGNOSIS — E03.9 ACQUIRED HYPOTHYROIDISM: ICD-10-CM

## 2024-09-24 DIAGNOSIS — R74.8 ELEVATED LIVER ENZYMES: ICD-10-CM

## 2024-09-24 DIAGNOSIS — E11.9 TYPE 2 DIABETES MELLITUS WITHOUT COMPLICATION, WITHOUT LONG-TERM CURRENT USE OF INSULIN (MULTI): Primary | ICD-10-CM

## 2024-09-24 DIAGNOSIS — I10 PRIMARY HYPERTENSION: ICD-10-CM

## 2024-09-24 PROCEDURE — 1158F ADVNC CARE PLAN TLK DOCD: CPT

## 2024-09-24 PROCEDURE — 3079F DIAST BP 80-89 MM HG: CPT

## 2024-09-24 PROCEDURE — G2211 COMPLEX E/M VISIT ADD ON: HCPCS

## 2024-09-24 PROCEDURE — 3061F NEG MICROALBUMINURIA REV: CPT

## 2024-09-24 PROCEDURE — 3048F LDL-C <100 MG/DL: CPT

## 2024-09-24 PROCEDURE — 3075F SYST BP GE 130 - 139MM HG: CPT

## 2024-09-24 PROCEDURE — 1159F MED LIST DOCD IN RCRD: CPT

## 2024-09-24 PROCEDURE — 1123F ACP DISCUSS/DSCN MKR DOCD: CPT

## 2024-09-24 PROCEDURE — 1126F AMNT PAIN NOTED NONE PRSNT: CPT

## 2024-09-24 PROCEDURE — 99214 OFFICE O/P EST MOD 30 MIN: CPT

## 2024-09-24 PROCEDURE — 3044F HG A1C LEVEL LT 7.0%: CPT

## 2024-09-24 PROCEDURE — 3008F BODY MASS INDEX DOCD: CPT

## 2024-09-24 PROCEDURE — 1036F TOBACCO NON-USER: CPT

## 2024-09-24 RX ORDER — BLOOD-GLUCOSE METER
1 EACH MISCELLANEOUS 3 TIMES DAILY
Qty: 300 STRIP | Refills: 3 | Status: SHIPPED | OUTPATIENT
Start: 2024-09-24

## 2024-09-24 RX ORDER — LISINOPRIL AND HYDROCHLOROTHIAZIDE 12.5; 2 MG/1; MG/1
1 TABLET ORAL 2 TIMES DAILY
Qty: 200 TABLET | Refills: 3 | Status: SHIPPED | OUTPATIENT
Start: 2024-09-24

## 2024-09-24 RX ORDER — METFORMIN HYDROCHLORIDE 500 MG/1
500 TABLET, EXTENDED RELEASE ORAL
Qty: 180 TABLET | Refills: 3 | Status: SHIPPED | OUTPATIENT
Start: 2024-09-24 | End: 2025-09-24

## 2024-09-24 RX ORDER — LEVOTHYROXINE SODIUM 137 UG/1
137 TABLET ORAL DAILY
Qty: 100 TABLET | Refills: 3 | Status: SHIPPED | OUTPATIENT
Start: 2024-09-24

## 2024-09-24 RX ORDER — LANCETS 33 GAUGE
EACH MISCELLANEOUS
Qty: 300 EACH | Refills: 3 | Status: SHIPPED | OUTPATIENT
Start: 2024-09-24

## 2024-09-24 RX ORDER — AZELASTINE 1 MG/ML
SPRAY, METERED NASAL
Qty: 30 ML | Refills: 11 | Status: SHIPPED | OUTPATIENT
Start: 2024-09-24

## 2024-09-24 RX ORDER — AMLODIPINE BESYLATE 5 MG/1
5 TABLET ORAL DAILY
Qty: 90 TABLET | Refills: 3 | Status: SHIPPED | OUTPATIENT
Start: 2024-09-24

## 2024-09-24 RX ORDER — ATORVASTATIN CALCIUM 40 MG/1
40 TABLET, FILM COATED ORAL DAILY
Qty: 90 TABLET | Refills: 3 | Status: SHIPPED | OUTPATIENT
Start: 2024-09-24

## 2024-09-24 RX ORDER — METOPROLOL TARTRATE 50 MG/1
50 TABLET ORAL 2 TIMES DAILY
Qty: 200 TABLET | Refills: 3 | Status: SHIPPED | OUTPATIENT
Start: 2024-09-24

## 2024-09-24 SDOH — ECONOMIC STABILITY: FOOD INSECURITY: WITHIN THE PAST 12 MONTHS, THE FOOD YOU BOUGHT JUST DIDN'T LAST AND YOU DIDN'T HAVE MONEY TO GET MORE.: NEVER TRUE

## 2024-09-24 SDOH — ECONOMIC STABILITY: FOOD INSECURITY: WITHIN THE PAST 12 MONTHS, YOU WORRIED THAT YOUR FOOD WOULD RUN OUT BEFORE YOU GOT MONEY TO BUY MORE.: NEVER TRUE

## 2024-09-24 ASSESSMENT — LIFESTYLE VARIABLES
AUDIT-C TOTAL SCORE: 0
HOW MANY STANDARD DRINKS CONTAINING ALCOHOL DO YOU HAVE ON A TYPICAL DAY: PATIENT DOES NOT DRINK
HOW OFTEN DO YOU HAVE SIX OR MORE DRINKS ON ONE OCCASION: NEVER
HOW OFTEN DO YOU HAVE A DRINK CONTAINING ALCOHOL: NEVER
SKIP TO QUESTIONS 9-10: 1

## 2024-09-24 ASSESSMENT — ENCOUNTER SYMPTOMS
DEPRESSION: 0
EYES NEGATIVE: 1
NAUSEA: 0
CONSTIPATION: 0
RESPIRATORY NEGATIVE: 1
OCCASIONAL FEELINGS OF UNSTEADINESS: 0
NEUROLOGICAL NEGATIVE: 1
ENDOCRINE NEGATIVE: 1
MUSCULOSKELETAL NEGATIVE: 1
DIARRHEA: 1
CONSTITUTIONAL NEGATIVE: 1
CARDIOVASCULAR NEGATIVE: 1
HEMATOLOGIC/LYMPHATIC NEGATIVE: 1
PSYCHIATRIC NEGATIVE: 1
LOSS OF SENSATION IN FEET: 0

## 2024-09-24 ASSESSMENT — PAIN SCALES - GENERAL: PAINLEVEL: 0-NO PAIN

## 2024-09-24 ASSESSMENT — COLUMBIA-SUICIDE SEVERITY RATING SCALE - C-SSRS
6. HAVE YOU EVER DONE ANYTHING, STARTED TO DO ANYTHING, OR PREPARED TO DO ANYTHING TO END YOUR LIFE?: NO
2. HAVE YOU ACTUALLY HAD ANY THOUGHTS OF KILLING YOURSELF?: NO
1. IN THE PAST MONTH, HAVE YOU WISHED YOU WERE DEAD OR WISHED YOU COULD GO TO SLEEP AND NOT WAKE UP?: NO

## 2024-09-24 NOTE — ASSESSMENT & PLAN NOTE
"A1c stable at 6.9% on labs from 9/2024  Having lows in the evenings to 64-90s, feels \"off\"    Stop glimepiride; increase Ozempic to 1 mg  Continue metformin XR 1000mg twice daily  "

## 2024-09-24 NOTE — PROGRESS NOTES
Subjective   Patient ID: Alma Rosa Lr is a 69 y.o. female who presents for 3 month follow up of  HTN and DM2.    Switched to ozempic at last visit, self increased to 0.5mg dose for the last 2 weeks. No GI side effects from medications. Continues to have explosive diarrhea in the morning but does have formed stool. Diarrhea associated with metformin use.   Amlodipine increased to 5mg daily, home blood pressures running typically 110-130/60-70s  Believes weight is stable at home, down 5lbs from last office visit weight documentation.    Diet: Does her own cooking, not eating out much. Eating two meals per day (big breakfast, fruit/cheese in afternoon, meat/veggie/ small starch). No soda, 3 cups coffee in the morning with cream and splenda.   Exercise: Started Silver Sneakers a few weeks ago, combination of cardio and lifting or 20 minute cardios daily, exercising 5-6 days per week. Does a lot of housework  Weight: Down 5lbs over the last 3 months  Water: Drinking about 2-3 large glasses per day  Sleep: Good sleep, getting about 8-9 hours per night, using CPAP nightly  Social: , lives in a ranch home with walkout basement- no issues with stairs. 2 adult children, daughter and son-in-law live across the street, other son and daughter in law live in [place]. 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    HENT: Negative.     Eyes: Negative.    Respiratory: Negative.     Cardiovascular: Negative.    Gastrointestinal:  Positive for diarrhea. Negative for constipation and nausea.   Endocrine: Negative.    Genitourinary: Negative.    Musculoskeletal: Negative.    Skin: Negative.    Neurological: Negative.    Hematological: Negative.    Psychiatric/Behavioral: Negative.          Current Outpatient Medications   Medication Sig Dispense Refill    doxycycline (Vibra-Tabs) 100 mg tablet Take 1 tablet (100 mg) by mouth every 12 hours.      magnesium oxide (Mag-Ox) 400 mg tablet  Take 1 tablet (400 mg) by mouth once daily.      multivitamin-min-iron-FA-vit K (Adults Multivitamin) 18 mg iron-400 mcg-25 mcg tablet Take 1 tablet by mouth once daily.      amLODIPine (Norvasc) 5 mg tablet Take 1 tablet (5 mg) by mouth once daily. 90 tablet 3    atorvastatin (Lipitor) 40 mg tablet Take 1 tablet (40 mg) by mouth once daily. 90 tablet 3    azelastine (Astelin) 137 mcg (0.1 %) nasal spray USE 2 SPRAYS IN EACH NOSTRIL TWICE DAILY AS NEEDED 30 mL 11    blood sugar diagnostic (OneTouch Verio test strips) strip 1 strip 3 times a day. 300 strip 3    levothyroxine (Synthroid, Levoxyl) 137 mcg tablet Take 1 tablet (137 mcg) by mouth once daily. as directed 100 tablet 3    lisinopriL-hydrochlorothiazide 20-12.5 mg tablet Take 1 tablet by mouth 2 times a day. 200 tablet 3    metFORMIN XR (Glucophage-XR) 500 mg 24 hr tablet Take 1 tablet (500 mg) by mouth 2 times daily (morning and late afternoon). Do not crush, chew, or split. 180 tablet 3    metoprolol tartrate (Lopressor) 50 mg tablet Take 1 tablet by mouth 2 times a day. 200 tablet 3    OneTouch Delica Plus Lancet 30 gauge misc Use to test blood sugar three times per day 300 each 3    semaglutide (OZEMPIC) 1 mg/dose (4 mg/3 mL) pen injector Inject 1 mg under the skin 1 (one) time per week. 3 mL 4     No current facility-administered medications for this visit.     Past Surgical History:   Procedure Laterality Date    CARPAL TUNNEL RELEASE  11/08/2016    Neuroplasty Median Nerve At Carpal Tunnel    DILATION AND CURETTAGE OF UTERUS  11/08/2016    Dilation And Curettage    OTHER SURGICAL HISTORY  02/07/2020    Root canal procedure    OTHER SURGICAL HISTORY  04/24/2019    Colonoscopy    OTHER SURGICAL HISTORY  04/24/2019    Cholecystectomy     Family History   Problem Relation Name Age of Onset    No Known Problems Mother      No Known Problems Father      Diabetes Other      Heart failure Other      Glaucoma Other      Coronary artery disease Other      Other  "(venous insuff.) Other        Social History     Tobacco Use    Smoking status: Never     Passive exposure: Never    Smokeless tobacco: Never   Vaping Use    Vaping status: Never Used   Substance Use Topics    Alcohol use: Yes     Alcohol/week: 4.0 standard drinks of alcohol     Types: 4 Glasses of wine per week    Drug use: Never        Objective     Visit Vitals  /80 (BP Location: Right arm, Patient Position: Sitting, BP Cuff Size: Adult)   Pulse 75   Temp 35.9 °C (96.6 °F) (Temporal)   Resp 16   Ht 1.638 m (5' 4.5\")   Wt 110 kg (242 lb)   SpO2 94%   BMI 40.90 kg/m²   OB Status Postmenopausal   Smoking Status Never   BSA 2.24 m²        Physical Exam  Constitutional:       Appearance: Normal appearance.   HENT:      Head: Normocephalic and atraumatic.   Eyes:      Extraocular Movements: Extraocular movements intact.      Pupils: Pupils are equal, round, and reactive to light.   Pulmonary:      Effort: Pulmonary effort is normal.   Musculoskeletal:         General: Normal range of motion.   Skin:     General: Skin is warm and dry.      Capillary Refill: Capillary refill takes less than 2 seconds.   Neurological:      General: No focal deficit present.      Mental Status: She is alert and oriented to person, place, and time.   Psychiatric:         Mood and Affect: Mood normal.         Behavior: Behavior normal.           Assessment/Plan   Problem List Items Addressed This Visit       Diabetes mellitus (Multi) - Primary     A1c stable at 6.9% on labs from 9/2024  Having lows in the evenings to 64-90s, feels \"off\"    Stop glimepiride; increase Ozempic to 1 mg  Continue metformin XR 1000mg twice daily         Relevant Medications    semaglutide (OZEMPIC) 1 mg/dose (4 mg/3 mL) pen injector    metFORMIN XR (Glucophage-XR) 500 mg 24 hr tablet    Other Relevant Orders    Hemoglobin A1C    Comprehensive Metabolic Panel    Elevated liver enzymes     Persistently elevated AST/ALT on labs from 9/2024,  41/67 " respectively  Discussed liver US, patient would like to defer at this time and continue to trend readings given absence of symptoms and weight loss/diet improvement attempts in process    Continue to trend AST/ALT          Acquired hypothyroidism     TSH in normal range on labs from 9/2024    Continue levothyroxine 137mcg daily         Hypertension     Borderline hypertensive in office at 137/80  Home blood pressures running 110-130/60-70s    Continue amlodipine 5mg daily, lisinopril-hydrochlorothiazide 20-12.5mg daily         Relevant Medications    amLODIPine (Norvasc) 5 mg tablet    Seasonal allergies    Relevant Medications    azelastine (Astelin) 137 mcg (0.1 %) nasal spray     Other Visit Diagnoses       Type 2 diabetes mellitus without complications (Multi)        Relevant Medications    blood sugar diagnostic (OneTouch Verio test strips) strip    atorvastatin (Lipitor) 40 mg tablet    OneTouch Delica Plus Lancet 30 gauge misc    Other Relevant Orders    Hemoglobin A1C    Comprehensive Metabolic Panel    Hypothyroidism, unspecified        Relevant Medications    levothyroxine (Synthroid, Levoxyl) 137 mcg tablet    Essential (primary) hypertension        Relevant Medications    lisinopriL-hydrochlorothiazide 20-12.5 mg tablet    metoprolol tartrate (Lopressor) 50 mg tablet    Breast cancer screening by mammogram        Relevant Orders    BI mammo bilateral screening tomosynthesis            All pertinent lab work and results were reviewed with patient.     Follow up with me in 3 months    JOHN PAUL Akins-CNS

## 2024-09-24 NOTE — ASSESSMENT & PLAN NOTE
Persistently elevated AST/ALT on labs from 9/2024,  41/67 respectively  Discussed liver US, patient would like to defer at this time and continue to trend readings given absence of symptoms and weight loss/diet improvement attempts in process    Continue to trend AST/ALT

## 2024-09-24 NOTE — ASSESSMENT & PLAN NOTE
Borderline hypertensive in office at 137/80  Home blood pressures running 110-130/60-70s    Continue amlodipine 5mg daily, lisinopril-hydrochlorothiazide 20-12.5mg daily

## 2024-09-24 NOTE — PATIENT INSTRUCTIONS
Thank you for coming to see me today.  If you have any questions or concerns following our visit, please contact the office.  Phone: [phone]    Follow up with me in 3 months or sooner as needed    1)  INCREASE ozempic to 1mg weekly    2) STOP glimepiride    3) DECREASE metformin to 500mg twice daily. If sugars continue to trend upward can add additional dose of metformin in morning or evening OR can consider increasing Ozempic to 2 mg dose    4) Get non-fasting labwork a few days prior to next visit.  The lab is down the [address]imm from our office.

## 2024-10-24 ENCOUNTER — PATIENT MESSAGE (OUTPATIENT)
Dept: PRIMARY CARE | Facility: CLINIC | Age: 69
End: 2024-10-24
Payer: MEDICARE

## 2024-10-24 DIAGNOSIS — E11.9 TYPE 2 DIABETES MELLITUS WITHOUT COMPLICATION, WITHOUT LONG-TERM CURRENT USE OF INSULIN (MULTI): Primary | ICD-10-CM

## 2024-10-24 RX ORDER — SEMAGLUTIDE 2.68 MG/ML
2 INJECTION, SOLUTION SUBCUTANEOUS
Qty: 9 ML | Refills: 3 | Status: SHIPPED | OUTPATIENT
Start: 2024-10-24

## 2024-11-06 ENCOUNTER — APPOINTMENT (OUTPATIENT)
Dept: RADIOLOGY | Facility: CLINIC | Age: 69
End: 2024-11-06
Payer: MEDICARE

## 2024-11-18 ENCOUNTER — HOSPITAL ENCOUNTER (OUTPATIENT)
Dept: RADIOLOGY | Facility: CLINIC | Age: 69
Discharge: HOME | End: 2024-11-18
Payer: MEDICARE

## 2024-11-18 DIAGNOSIS — Z12.31 BREAST CANCER SCREENING BY MAMMOGRAM: ICD-10-CM

## 2024-11-18 PROCEDURE — 77067 SCR MAMMO BI INCL CAD: CPT | Performed by: RADIOLOGY

## 2024-11-18 PROCEDURE — 77063 BREAST TOMOSYNTHESIS BI: CPT | Performed by: RADIOLOGY

## 2024-11-18 PROCEDURE — 77067 SCR MAMMO BI INCL CAD: CPT

## 2024-12-16 ENCOUNTER — LAB (OUTPATIENT)
Dept: LAB | Facility: LAB | Age: 69
End: 2024-12-16
Payer: MEDICARE

## 2024-12-16 DIAGNOSIS — E11.9 TYPE 2 DIABETES MELLITUS WITHOUT COMPLICATION, WITHOUT LONG-TERM CURRENT USE OF INSULIN (MULTI): ICD-10-CM

## 2024-12-16 LAB
ALBUMIN SERPL BCP-MCNC: 4.4 G/DL (ref 3.4–5)
ALP SERPL-CCNC: 105 U/L (ref 33–136)
ALT SERPL W P-5'-P-CCNC: 78 U/L (ref 7–45)
ANION GAP SERPL CALC-SCNC: 14 MMOL/L (ref 10–20)
AST SERPL W P-5'-P-CCNC: 44 U/L (ref 9–39)
BILIRUB SERPL-MCNC: 0.6 MG/DL (ref 0–1.2)
BUN SERPL-MCNC: 14 MG/DL (ref 6–23)
CALCIUM SERPL-MCNC: 9.7 MG/DL (ref 8.6–10.3)
CHLORIDE SERPL-SCNC: 97 MMOL/L (ref 98–107)
CO2 SERPL-SCNC: 31 MMOL/L (ref 21–32)
CREAT SERPL-MCNC: 0.85 MG/DL (ref 0.5–1.05)
EGFRCR SERPLBLD CKD-EPI 2021: 75 ML/MIN/1.73M*2
EST. AVERAGE GLUCOSE BLD GHB EST-MCNC: 137 MG/DL
GLUCOSE SERPL-MCNC: 119 MG/DL (ref 74–99)
HBA1C MFR BLD: 6.4 %
POTASSIUM SERPL-SCNC: 4.1 MMOL/L (ref 3.5–5.3)
PROT SERPL-MCNC: 6.9 G/DL (ref 6.4–8.2)
SODIUM SERPL-SCNC: 138 MMOL/L (ref 136–145)

## 2024-12-16 PROCEDURE — 36415 COLL VENOUS BLD VENIPUNCTURE: CPT

## 2024-12-16 PROCEDURE — 83036 HEMOGLOBIN GLYCOSYLATED A1C: CPT

## 2024-12-16 PROCEDURE — 80053 COMPREHEN METABOLIC PANEL: CPT

## 2024-12-27 ENCOUNTER — OFFICE VISIT (OUTPATIENT)
Dept: PRIMARY CARE | Facility: CLINIC | Age: 69
End: 2024-12-27
Payer: MEDICARE

## 2024-12-27 VITALS
BODY MASS INDEX: 36.99 KG/M2 | TEMPERATURE: 98.1 F | HEART RATE: 74 BPM | OXYGEN SATURATION: 97 % | SYSTOLIC BLOOD PRESSURE: 113 MMHG | WEIGHT: 222 LBS | DIASTOLIC BLOOD PRESSURE: 75 MMHG | HEIGHT: 65 IN | RESPIRATION RATE: 16 BRPM

## 2024-12-27 DIAGNOSIS — R74.8 ELEVATED LIVER ENZYMES: ICD-10-CM

## 2024-12-27 DIAGNOSIS — E11.9 TYPE 2 DIABETES MELLITUS WITHOUT COMPLICATION, WITHOUT LONG-TERM CURRENT USE OF INSULIN (MULTI): Primary | ICD-10-CM

## 2024-12-27 DIAGNOSIS — I10 PRIMARY HYPERTENSION: ICD-10-CM

## 2024-12-27 DIAGNOSIS — Z11.59 ENCOUNTER FOR HEPATITIS C SCREENING TEST FOR LOW RISK PATIENT: ICD-10-CM

## 2024-12-27 PROBLEM — E66.9 OBESITY (BMI 30-39.9): Status: RESOLVED | Noted: 2023-06-15 | Resolved: 2024-12-27

## 2024-12-27 PROCEDURE — 99214 OFFICE O/P EST MOD 30 MIN: CPT

## 2024-12-27 PROCEDURE — 3078F DIAST BP <80 MM HG: CPT

## 2024-12-27 PROCEDURE — 3048F LDL-C <100 MG/DL: CPT

## 2024-12-27 PROCEDURE — 1123F ACP DISCUSS/DSCN MKR DOCD: CPT

## 2024-12-27 PROCEDURE — 3044F HG A1C LEVEL LT 7.0%: CPT

## 2024-12-27 PROCEDURE — 3008F BODY MASS INDEX DOCD: CPT

## 2024-12-27 PROCEDURE — 3074F SYST BP LT 130 MM HG: CPT

## 2024-12-27 PROCEDURE — 3061F NEG MICROALBUMINURIA REV: CPT

## 2024-12-27 PROCEDURE — 1036F TOBACCO NON-USER: CPT

## 2024-12-27 PROCEDURE — 1160F RVW MEDS BY RX/DR IN RCRD: CPT

## 2024-12-27 PROCEDURE — G2211 COMPLEX E/M VISIT ADD ON: HCPCS

## 2024-12-27 PROCEDURE — 1126F AMNT PAIN NOTED NONE PRSNT: CPT

## 2024-12-27 PROCEDURE — 1159F MED LIST DOCD IN RCRD: CPT

## 2024-12-27 SDOH — ECONOMIC STABILITY: FOOD INSECURITY: WITHIN THE PAST 12 MONTHS, YOU WORRIED THAT YOUR FOOD WOULD RUN OUT BEFORE YOU GOT MONEY TO BUY MORE.: NEVER TRUE

## 2024-12-27 SDOH — ECONOMIC STABILITY: FOOD INSECURITY: WITHIN THE PAST 12 MONTHS, THE FOOD YOU BOUGHT JUST DIDN'T LAST AND YOU DIDN'T HAVE MONEY TO GET MORE.: NEVER TRUE

## 2024-12-27 ASSESSMENT — ENCOUNTER SYMPTOMS
RESPIRATORY NEGATIVE: 1
CARDIOVASCULAR NEGATIVE: 1
ENDOCRINE NEGATIVE: 1
EYES NEGATIVE: 1
OCCASIONAL FEELINGS OF UNSTEADINESS: 0
HEMATOLOGIC/LYMPHATIC NEGATIVE: 1
DEPRESSION: 0
CONSTITUTIONAL NEGATIVE: 1
PSYCHIATRIC NEGATIVE: 1
GASTROINTESTINAL NEGATIVE: 1
NEUROLOGICAL NEGATIVE: 1
LOSS OF SENSATION IN FEET: 0
MUSCULOSKELETAL NEGATIVE: 1

## 2024-12-27 ASSESSMENT — PAIN SCALES - GENERAL: PAINLEVEL_OUTOF10: 0-NO PAIN

## 2024-12-27 ASSESSMENT — PATIENT HEALTH QUESTIONNAIRE - PHQ9
1. LITTLE INTEREST OR PLEASURE IN DOING THINGS: NOT AT ALL
2. FEELING DOWN, DEPRESSED OR HOPELESS: NOT AT ALL
1. LITTLE INTEREST OR PLEASURE IN DOING THINGS: NOT AT ALL
2. FEELING DOWN, DEPRESSED OR HOPELESS: NOT AT ALL
SUM OF ALL RESPONSES TO PHQ9 QUESTIONS 1 AND 2: 0
SUM OF ALL RESPONSES TO PHQ9 QUESTIONS 1 & 2: 0

## 2024-12-27 ASSESSMENT — LIFESTYLE VARIABLES
SKIP TO QUESTIONS 9-10: 1
HOW MANY STANDARD DRINKS CONTAINING ALCOHOL DO YOU HAVE ON A TYPICAL DAY: PATIENT DOES NOT DRINK
HOW OFTEN DO YOU HAVE A DRINK CONTAINING ALCOHOL: NEVER
AUDIT-C TOTAL SCORE: 0
HOW OFTEN DO YOU HAVE SIX OR MORE DRINKS ON ONE OCCASION: NEVER

## 2024-12-27 NOTE — PROGRESS NOTES
Subjective   Patient ID: Alma Rosa Lr is a 69 y.o. female who presents for 3 month follow up of hypertension and DM2.      Home blood pressures running 110-120/60-70s.     Ozempic dose increased to 2mg weekly on 10/24/24.  A1c down to 6.4% on labs from 12/2024. Pleased as her home fasting glucoses have been running 90-120s on glucometer when checked through the day.     Continues to have persistently elevated liver enzymes despite improved diet, decreased intake and weight loss.     Diet: Does her own cooking, not eating out much. Eating two meals per day (big breakfast, fruit/cheese in afternoon, meat/veggie/ small starch). No soda, 3 cups coffee in the morning with cream and splenda.   Exercise: Started Silver Sneakers a few weeks ago, combination of cardio and lifting or 20 minute cardios daily, exercising 5-6 days per week. Does a lot of housework  Weight: Down 20lbs over the last 3 months planned  Water: Drinking about 2-3 large glasses per day  Sleep: Good sleep, getting about 8-9 hours per night, using CPAP nightly  Social: , lives in a ranch home with walkout basement- no issues with stairs. 2 adult children, daughter and son-in-law live across the street, other son and daughter in law live in [place]. 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    HENT: Negative.     Eyes: Negative.    Respiratory: Negative.     Cardiovascular: Negative.    Gastrointestinal: Negative.    Endocrine: Negative.    Genitourinary: Negative.    Musculoskeletal: Negative.    Skin: Negative.    Neurological: Negative.    Hematological: Negative.    Psychiatric/Behavioral: Negative.          Current Outpatient Medications   Medication Sig Dispense Refill    amLODIPine (Norvasc) 5 mg tablet Take 1 tablet (5 mg) by mouth once daily. 90 tablet 3    atorvastatin (Lipitor) 40 mg tablet Take 1 tablet (40 mg) by mouth once daily. 90 tablet 3    azelastine (Astelin) 137 mcg (0.1 %) nasal  spray USE 2 SPRAYS IN EACH NOSTRIL TWICE DAILY AS NEEDED 30 mL 11    blood sugar diagnostic (OneTouch Verio test strips) strip 1 strip 3 times a day. 300 strip 3    doxycycline (Vibra-Tabs) 100 mg tablet Take 1 tablet (100 mg) by mouth every 12 hours.      levothyroxine (Synthroid, Levoxyl) 137 mcg tablet Take 1 tablet (137 mcg) by mouth once daily. as directed 100 tablet 3    lisinopriL-hydrochlorothiazide 20-12.5 mg tablet Take 1 tablet by mouth 2 times a day. 200 tablet 3    magnesium oxide (Mag-Ox) 400 mg tablet Take 1 tablet (400 mg) by mouth once daily.      metFORMIN XR (Glucophage-XR) 500 mg 24 hr tablet Take 1 tablet (500 mg) by mouth 2 times daily (morning and late afternoon). Do not crush, chew, or split. 180 tablet 3    metoprolol tartrate (Lopressor) 50 mg tablet Take 1 tablet by mouth 2 times a day. 200 tablet 3    multivitamin-min-iron-FA-vit K (Adults Multivitamin) 18 mg iron-400 mcg-25 mcg tablet Take 1 tablet by mouth once daily.      OneTouch Delica Plus Lancet 30 gauge misc Use to test blood sugar three times per day 300 each 3    semaglutide (Ozempic) 2 mg/dose (8 mg/3 mL) pen injector Inject 2 mg under the skin every 7 days. 9 mL 3    empagliflozin (Jardiance) 10 mg Take 1 tablet (10 mg) by mouth once daily. 30 tablet 5     No current facility-administered medications for this visit.     Past Surgical History:   Procedure Laterality Date    CARPAL TUNNEL RELEASE  11/08/2016    Neuroplasty Median Nerve At Carpal Tunnel    DILATION AND CURETTAGE OF UTERUS  11/08/2016    Dilation And Curettage    OTHER SURGICAL HISTORY  02/07/2020    Root canal procedure    OTHER SURGICAL HISTORY  04/24/2019    Colonoscopy    OTHER SURGICAL HISTORY  04/24/2019    Cholecystectomy     Family History   Problem Relation Name Age of Onset    No Known Problems Mother      No Known Problems Father      Diabetes Other      Heart failure Other      Glaucoma Other      Coronary artery disease Other      Other (venous  "insuff.) Other        Social History     Tobacco Use    Smoking status: Never     Passive exposure: Never    Smokeless tobacco: Never   Vaping Use    Vaping status: Never Used   Substance Use Topics    Alcohol use: Yes     Alcohol/week: 4.0 standard drinks of alcohol     Types: 4 Glasses of wine per week    Drug use: Never        Objective     Visit Vitals  /75 (BP Location: Left arm, Patient Position: Sitting, BP Cuff Size: Adult)   Pulse 74   Temp 36.7 °C (98.1 °F) (Temporal)   Resp 16   Ht 1.651 m (5' 5\")   Wt 101 kg (222 lb)   SpO2 97%   BMI 36.94 kg/m²   OB Status Postmenopausal   Smoking Status Never   BSA 2.15 m²        Physical Exam  Constitutional:       Appearance: Normal appearance.   HENT:      Head: Normocephalic and atraumatic.   Eyes:      Extraocular Movements: Extraocular movements intact.      Pupils: Pupils are equal, round, and reactive to light.   Pulmonary:      Effort: Pulmonary effort is normal.   Abdominal:      General: Abdomen is flat.      Tenderness: There is no abdominal tenderness.   Musculoskeletal:         General: Normal range of motion.   Skin:     General: Skin is warm and dry.      Capillary Refill: Capillary refill takes less than 2 seconds.   Neurological:      General: No focal deficit present.      Mental Status: She is alert and oriented to person, place, and time.   Psychiatric:         Mood and Affect: Mood normal.         Behavior: Behavior normal.         Assessment/Plan   Problem List Items Addressed This Visit       Diabetes mellitus (Multi) - Primary     A1c down to 6.4% on labs from 12/2024  Maintained on semaglutide 2mg weekly, metformin XR 500mg BID  Having diarrhea from metformin    Decrease metformin to 500mg daily; start Jardiance 10 mg daily  Repeat A1c prior to next visit         Relevant Medications    empagliflozin (Jardiance) 10 mg    Other Relevant Orders    Hemoglobin A1C    Elevated liver enzymes     AST/ALT mildly increased on labs from 12/2024   " 44/78 respectively    Obtain US liver to evaluate further  Advised to refrain from EtOH intake  Repeat liver enzymes prior to next visit  Low threshold to refer to Dr. Saunders in hepatology for further evaluation         Relevant Orders    US abdomen limited liver    Hepatitis C antibody    Hepatic function panel    Hypertension     Normotensive in office at 113/75  Home blood pressures running 110-120/60-70s    Continue amlodipine 5mg daily, lisinopril-hydrochlorothiazide 20-12.5mg daily and metoprolol tartrate 50mg BID          Other Visit Diagnoses       Encounter for hepatitis C screening test for low risk patient                All pertinent lab work and results were reviewed with patient.     Follow up with me in 3 months    Christi Mitchell, APRN-CNS

## 2024-12-27 NOTE — ASSESSMENT & PLAN NOTE
AST/ALT mildly increased on labs from 12/2024 44/78 respectively    Obtain US liver to evaluate further  Advised to refrain from EtOH intake  Repeat liver enzymes prior to next visit  Low threshold to refer to Dr. Saunders in hepatology for further evaluation

## 2024-12-27 NOTE — ASSESSMENT & PLAN NOTE
Normotensive in office at 113/75  Home blood pressures running 110-120/60-70s    Continue amlodipine 5mg daily, lisinopril-hydrochlorothiazide 20-12.5mg daily and metoprolol tartrate 50mg BID

## 2024-12-27 NOTE — ASSESSMENT & PLAN NOTE
A1c down to 6.4% on labs from 12/2024  Maintained on semaglutide 2mg weekly, metformin XR 500mg BID  Having diarrhea from metformin    Decrease metformin to 500mg daily; start Jardiance 10 mg daily  Repeat A1c prior to next visit

## 2024-12-27 NOTE — PATIENT INSTRUCTIONS
Thank you for coming to see me today.  If you have any questions or concerns following our visit, please contact the office.  Phone: [phone]    Follow up with me in 3 months for DM2 and liver enzymes    1)  Get non-fasting labwork a few days prior to next visit.  The lab is down the [place] from our office.     2) Please schedule a abdominal US to evaluate for structural causes of elevated liver enzymes - please call [phone] or schedule at  on your way out today     3) DECREASE metformin to 500mg daily    4) START Jardiance 10mg daily

## 2024-12-30 ENCOUNTER — APPOINTMENT (OUTPATIENT)
Dept: PRIMARY CARE | Facility: CLINIC | Age: 69
End: 2024-12-30
Payer: MEDICARE

## 2025-01-06 ENCOUNTER — HOSPITAL ENCOUNTER (OUTPATIENT)
Dept: RADIOLOGY | Facility: CLINIC | Age: 70
Discharge: HOME | End: 2025-01-06
Payer: MEDICARE

## 2025-01-06 DIAGNOSIS — R74.8 ELEVATED LIVER ENZYMES: ICD-10-CM

## 2025-01-06 PROCEDURE — 76705 ECHO EXAM OF ABDOMEN: CPT

## 2025-01-06 PROCEDURE — 76705 ECHO EXAM OF ABDOMEN: CPT | Performed by: STUDENT IN AN ORGANIZED HEALTH CARE EDUCATION/TRAINING PROGRAM

## 2025-01-16 ENCOUNTER — PATIENT MESSAGE (OUTPATIENT)
Dept: PRIMARY CARE | Facility: CLINIC | Age: 70
End: 2025-01-16
Payer: MEDICARE

## 2025-02-01 ENCOUNTER — PATIENT MESSAGE (OUTPATIENT)
Dept: PRIMARY CARE | Facility: CLINIC | Age: 70
End: 2025-02-01
Payer: MEDICARE

## 2025-02-01 DIAGNOSIS — E11.9 TYPE 2 DIABETES MELLITUS WITHOUT COMPLICATION, WITHOUT LONG-TERM CURRENT USE OF INSULIN (MULTI): ICD-10-CM

## 2025-03-12 ENCOUNTER — PATIENT MESSAGE (OUTPATIENT)
Dept: PRIMARY CARE | Facility: CLINIC | Age: 70
End: 2025-03-12
Payer: MEDICARE

## 2025-03-12 DIAGNOSIS — E11.9 TYPE 2 DIABETES MELLITUS WITHOUT COMPLICATION, WITHOUT LONG-TERM CURRENT USE OF INSULIN (MULTI): Primary | ICD-10-CM

## 2025-03-12 LAB
ALBUMIN SERPL-MCNC: 4.4 G/DL (ref 3.6–5.1)
ALBUMIN/GLOB SERPL: 1.6 (CALC) (ref 1–2.5)
ALP SERPL-CCNC: 113 U/L (ref 37–153)
ALT SERPL-CCNC: 44 U/L (ref 6–29)
AST SERPL-CCNC: 32 U/L (ref 10–35)
BILIRUB DIRECT SERPL-MCNC: 0.2 MG/DL
BILIRUB INDIRECT SERPL-MCNC: 0.5 MG/DL (CALC) (ref 0.2–1.2)
BILIRUB SERPL-MCNC: 0.7 MG/DL (ref 0.2–1.2)
EST. AVERAGE GLUCOSE BLD GHB EST-MCNC: 131 MG/DL
EST. AVERAGE GLUCOSE BLD GHB EST-SCNC: 7.3 MMOL/L
GLOBULIN SER CALC-MCNC: 2.7 G/DL (CALC) (ref 1.9–3.7)
HBA1C MFR BLD: 6.2 % OF TOTAL HGB
HCV AB SERPL QL IA: NORMAL
PROT SERPL-MCNC: 7.1 G/DL (ref 6.1–8.1)

## 2025-03-12 RX ORDER — LANCETS 33 GAUGE
EACH MISCELLANEOUS
Qty: 300 EACH | Refills: 3 | Status: SHIPPED | OUTPATIENT
Start: 2025-03-12

## 2025-03-12 RX ORDER — INSULIN PUMP SYRINGE, 3 ML
EACH MISCELLANEOUS
Qty: 1 EACH | Refills: 0 | Status: SHIPPED | OUTPATIENT
Start: 2025-03-12 | End: 2026-03-12

## 2025-03-12 RX ORDER — CALCIUM CITRATE/VITAMIN D3 200MG-6.25
1 TABLET ORAL 3 TIMES DAILY
Qty: 300 STRIP | Refills: 3 | Status: SHIPPED | OUTPATIENT
Start: 2025-03-12

## 2025-03-24 DIAGNOSIS — E11.9 TYPE 2 DIABETES MELLITUS WITHOUT COMPLICATION, WITHOUT LONG-TERM CURRENT USE OF INSULIN: ICD-10-CM

## 2025-03-25 RX ORDER — METFORMIN HYDROCHLORIDE 500 MG/1
TABLET, EXTENDED RELEASE ORAL
Qty: 180 TABLET | Refills: 3 | Status: SHIPPED | OUTPATIENT
Start: 2025-03-25

## 2025-04-01 ENCOUNTER — APPOINTMENT (OUTPATIENT)
Dept: PRIMARY CARE | Facility: CLINIC | Age: 70
End: 2025-04-01
Payer: MEDICARE

## 2025-04-01 VITALS
WEIGHT: 207 LBS | TEMPERATURE: 96.8 F | DIASTOLIC BLOOD PRESSURE: 78 MMHG | SYSTOLIC BLOOD PRESSURE: 122 MMHG | OXYGEN SATURATION: 97 % | HEART RATE: 75 BPM | BODY MASS INDEX: 34.49 KG/M2 | HEIGHT: 65 IN | RESPIRATION RATE: 14 BRPM

## 2025-04-01 DIAGNOSIS — I10 ESSENTIAL (PRIMARY) HYPERTENSION: ICD-10-CM

## 2025-04-01 DIAGNOSIS — E03.9 HYPOTHYROIDISM, UNSPECIFIED: ICD-10-CM

## 2025-04-01 DIAGNOSIS — J30.2 SEASONAL ALLERGIES: ICD-10-CM

## 2025-04-01 DIAGNOSIS — N90.5 VULVAR ATROPHY: ICD-10-CM

## 2025-04-01 DIAGNOSIS — Z00.00 ROUTINE GENERAL MEDICAL EXAMINATION AT HEALTH CARE FACILITY: Primary | ICD-10-CM

## 2025-04-01 DIAGNOSIS — E11.9 TYPE 2 DIABETES MELLITUS WITHOUT COMPLICATIONS: ICD-10-CM

## 2025-04-01 DIAGNOSIS — Z12.11 COLON CANCER SCREENING: ICD-10-CM

## 2025-04-01 DIAGNOSIS — E78.2 MIXED HYPERLIPIDEMIA: ICD-10-CM

## 2025-04-01 DIAGNOSIS — I10 PRIMARY HYPERTENSION: ICD-10-CM

## 2025-04-01 DIAGNOSIS — E11.9 TYPE 2 DIABETES MELLITUS WITHOUT COMPLICATION, WITHOUT LONG-TERM CURRENT USE OF INSULIN: ICD-10-CM

## 2025-04-01 DIAGNOSIS — Z00.00 ENCOUNTER FOR MEDICARE ANNUAL WELLNESS EXAM: ICD-10-CM

## 2025-04-01 DIAGNOSIS — B37.31 VAGINAL YEAST INFECTION: ICD-10-CM

## 2025-04-01 DIAGNOSIS — K76.0 HEPATIC STEATOSIS: ICD-10-CM

## 2025-04-01 PROCEDURE — 1170F FXNL STATUS ASSESSED: CPT

## 2025-04-01 PROCEDURE — 3008F BODY MASS INDEX DOCD: CPT

## 2025-04-01 PROCEDURE — G0439 PPPS, SUBSEQ VISIT: HCPCS

## 2025-04-01 PROCEDURE — 1159F MED LIST DOCD IN RCRD: CPT

## 2025-04-01 PROCEDURE — 1126F AMNT PAIN NOTED NONE PRSNT: CPT

## 2025-04-01 PROCEDURE — 99214 OFFICE O/P EST MOD 30 MIN: CPT

## 2025-04-01 PROCEDURE — 1123F ACP DISCUSS/DSCN MKR DOCD: CPT

## 2025-04-01 PROCEDURE — 1160F RVW MEDS BY RX/DR IN RCRD: CPT

## 2025-04-01 PROCEDURE — 3074F SYST BP LT 130 MM HG: CPT

## 2025-04-01 PROCEDURE — 99397 PER PM REEVAL EST PAT 65+ YR: CPT

## 2025-04-01 PROCEDURE — 3078F DIAST BP <80 MM HG: CPT

## 2025-04-01 RX ORDER — LEVOTHYROXINE SODIUM 137 UG/1
137 TABLET ORAL DAILY
Qty: 100 TABLET | Refills: 3 | Status: SHIPPED | OUTPATIENT
Start: 2025-04-01

## 2025-04-01 RX ORDER — AZELASTINE 1 MG/ML
SPRAY, METERED NASAL
Qty: 30 ML | Refills: 11 | Status: SHIPPED | OUTPATIENT
Start: 2025-04-01

## 2025-04-01 RX ORDER — ESTRADIOL 0.1 MG/G
CREAM VAGINAL
Qty: 126 G | Refills: 3 | Status: SHIPPED | OUTPATIENT
Start: 2025-04-01

## 2025-04-01 RX ORDER — LISINOPRIL AND HYDROCHLOROTHIAZIDE 12.5; 2 MG/1; MG/1
1 TABLET ORAL DAILY
Qty: 90 TABLET | Refills: 3 | Status: SHIPPED | OUTPATIENT
Start: 2025-04-01

## 2025-04-01 RX ORDER — SEMAGLUTIDE 2.68 MG/ML
2 INJECTION, SOLUTION SUBCUTANEOUS
Qty: 9 ML | Refills: 3 | Status: SHIPPED | OUTPATIENT
Start: 2025-04-01

## 2025-04-01 RX ORDER — METOPROLOL TARTRATE 50 MG/1
50 TABLET ORAL 2 TIMES DAILY
Qty: 200 TABLET | Refills: 3 | Status: SHIPPED | OUTPATIENT
Start: 2025-04-01

## 2025-04-01 RX ORDER — ATORVASTATIN CALCIUM 40 MG/1
40 TABLET, FILM COATED ORAL DAILY
Qty: 90 TABLET | Refills: 3 | Status: SHIPPED | OUTPATIENT
Start: 2025-04-01

## 2025-04-01 SDOH — ECONOMIC STABILITY: FOOD INSECURITY: WITHIN THE PAST 12 MONTHS, YOU WORRIED THAT YOUR FOOD WOULD RUN OUT BEFORE YOU GOT MONEY TO BUY MORE.: NEVER TRUE

## 2025-04-01 SDOH — ECONOMIC STABILITY: FOOD INSECURITY: WITHIN THE PAST 12 MONTHS, THE FOOD YOU BOUGHT JUST DIDN'T LAST AND YOU DIDN'T HAVE MONEY TO GET MORE.: NEVER TRUE

## 2025-04-01 ASSESSMENT — ACTIVITIES OF DAILY LIVING (ADL)
GROCERY_SHOPPING: INDEPENDENT
TAKING_MEDICATION: INDEPENDENT
BATHING: INDEPENDENT
DOING_HOUSEWORK: INDEPENDENT
DRESSING: INDEPENDENT
MANAGING_FINANCES: INDEPENDENT

## 2025-04-01 ASSESSMENT — ANXIETY QUESTIONNAIRES
5. BEING SO RESTLESS THAT IT IS HARD TO SIT STILL: NOT AT ALL
4. TROUBLE RELAXING: NOT AT ALL
3. WORRYING TOO MUCH ABOUT DIFFERENT THINGS: NOT AT ALL
GAD7 TOTAL SCORE: 0
1. FEELING NERVOUS, ANXIOUS, OR ON EDGE: NOT AT ALL
IF YOU CHECKED OFF ANY PROBLEMS ON THIS QUESTIONNAIRE, HOW DIFFICULT HAVE THESE PROBLEMS MADE IT FOR YOU TO DO YOUR WORK, TAKE CARE OF THINGS AT HOME, OR GET ALONG WITH OTHER PEOPLE: NOT DIFFICULT AT ALL
2. NOT BEING ABLE TO STOP OR CONTROL WORRYING: NOT AT ALL
6. BECOMING EASILY ANNOYED OR IRRITABLE: NOT AT ALL
7. FEELING AFRAID AS IF SOMETHING AWFUL MIGHT HAPPEN: NOT AT ALL

## 2025-04-01 ASSESSMENT — LIFESTYLE VARIABLES
SKIP TO QUESTIONS 9-10: 1
HOW OFTEN DO YOU HAVE A DRINK CONTAINING ALCOHOL: NEVER
HOW MANY STANDARD DRINKS CONTAINING ALCOHOL DO YOU HAVE ON A TYPICAL DAY: PATIENT DOES NOT DRINK
HOW OFTEN DO YOU HAVE SIX OR MORE DRINKS ON ONE OCCASION: NEVER
AUDIT-C TOTAL SCORE: 0

## 2025-04-01 ASSESSMENT — ENCOUNTER SYMPTOMS
GASTROINTESTINAL NEGATIVE: 1
NEUROLOGICAL NEGATIVE: 1
ENDOCRINE NEGATIVE: 1
OCCASIONAL FEELINGS OF UNSTEADINESS: 0
MUSCULOSKELETAL NEGATIVE: 1
LOSS OF SENSATION IN FEET: 0
PSYCHIATRIC NEGATIVE: 1
DEPRESSION: 0
HEMATOLOGIC/LYMPHATIC NEGATIVE: 1
EYES NEGATIVE: 1
RESPIRATORY NEGATIVE: 1
CONSTITUTIONAL NEGATIVE: 1
CARDIOVASCULAR NEGATIVE: 1

## 2025-04-01 ASSESSMENT — PAIN SCALES - GENERAL: PAINLEVEL_OUTOF10: 0-NO PAIN

## 2025-04-01 NOTE — ASSESSMENT & PLAN NOTE
Normotensive in office at 122/78  Has since discontinued amlodipine 5mg daily and has decreased lisinopril-hydrochlorothiazide to 20-12.5mg daily instead of twice daily    Continue lisinopril-hydrochlorothiazide 20-12.5mg daily with threshold to stop hydrochlorothiazide component if she becomes too hypotensive on this

## 2025-04-01 NOTE — PATIENT INSTRUCTIONS
Thank you for coming to see me today.  If you have any questions or concerns following our visit, please contact the office.  Phone: [phone]    Follow up with me in 6 months or sooner as needed    1)  Get fasting labwork a few days prior to next visit.  The lab is down the [place] from our office.     2) Cologuard screening will be mailed to your home, please complete within 1 week of receiving     3) Trial stopping metformin. If sugars increase and you want to restart it OK to to do so, let me know    4) START estrace cream to help with vulvovaginal atrophy- apply 1 gram to inside of vagina daily for 2 weeks then use every other day or 2-3 times per week thereafter

## 2025-04-01 NOTE — ASSESSMENT & PLAN NOTE
Wellness screenings/Immunizations:  Flu vaccination: Recommended annually  PCV: 1/2017  PPSV: 2/2022  Shingrix vaccine: Series complete  Colon cancer screening: Cologuard 3/2022, repeat study ordered. Aware colonoscopy will need to be diagnostic if cologuard is positive  Mammogram: 11/2024, WNL  DEXA scan: Last in 3/2024, Normal

## 2025-04-01 NOTE — PROGRESS NOTES
Subjective   Patient ID: Alma Rosa Lr is a 70 y.o. female who presents for 6 month follow up of DM2, elevated liver enzymes and hypertension.    Amlodipine discontinued in the interim on 1/16/2025 for hypotension in the 90s systolic. Has also had to decrease her lisinopril-hydrochlorothiazide to 1 tablet daily due to persistent blood pressures in the 90s systolic despite stopping amlodipine. Of note she is down 40ls over the past 10 months.     AST/ALT 32/44, down from 44/78.    Diet: Does her own cooking, eating out weekly typically goes to [employer]. Eating two meals per day (big breakfast, fruit/cheese in afternoon, meat/veggie/ small starch). No soda, 2 cups coffee in the morning with cream and splenda.   Exercise: Hasn't been going to Silver sneakers, doing a lot of stretching due to sciatica pain. Was having limiting sciatica pain during silver sneakers. Does a lot of housework  Weight: Down 40 lbs since June 2024 on GLP1  Water: Drinking about 2-3 large glasses per day or more  Sleep: Good sleep, getting about 8-9 hours per night, using CPAP nightly  Social: , lives in a ranch home with walkout basement- no issues with stairs. 2 adult children, daughter and son-in-law live across the street, other son and daughter in law live in [place]. 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    HENT: Negative.     Eyes: Negative.    Respiratory: Negative.     Cardiovascular: Negative.    Gastrointestinal: Negative.    Endocrine: Negative.    Genitourinary: Negative.    Musculoskeletal: Negative.    Skin: Negative.    Neurological: Negative.    Hematological: Negative.    Psychiatric/Behavioral: Negative.          Current Outpatient Medications   Medication Sig Dispense Refill    Blood glucose monitoring (True Metrix Glucose Meter) meter Test glucose three times daily and as needed for symptoms of low blood sugar 1 each 0    blood sugar diagnostic (True Metrix Glucose Test  Strip) strip 1 strip 3 times a day. 300 strip 3    empagliflozin (Jardiance) 10 mg Take 1 tablet (10 mg) by mouth once daily. 90 tablet 3    lancets (OneTouch Delica Plus Lancet) 33 gauge misc Kailash finger three times daily to check blood glucose 300 each 3    magnesium oxide (Mag-Ox) 400 mg tablet Take 1 tablet (400 mg) by mouth once daily.      multivitamin-min-iron-FA-vit K (Adults Multivitamin) 18 mg iron-400 mcg-25 mcg tablet Take 1 tablet by mouth once daily.      atorvastatin (Lipitor) 40 mg tablet Take 1 tablet (40 mg) by mouth once daily. 90 tablet 3    azelastine (Astelin) 137 mcg (0.1 %) nasal spray USE 2 SPRAYS IN EACH NOSTRIL TWICE DAILY AS NEEDED 30 mL 11    estradiol (Estrace) 0.01 % (0.1 mg/gram) vaginal cream Apply 1 g once daily to inside of vagina for 2 weeks, then apply 0.5 to 1 g one to three times per week thereafter 126 g 3    levothyroxine (Synthroid, Levoxyl) 137 mcg tablet Take 1 tablet (137 mcg) by mouth once daily. as directed 100 tablet 3    lisinopriL-hydrochlorothiazide 20-12.5 mg tablet Take 1 tablet by mouth once daily. 90 tablet 3    metoprolol tartrate (Lopressor) 50 mg tablet Take 1 tablet by mouth 2 times a day. 200 tablet 3    semaglutide (Ozempic) 2 mg/dose (8 mg/3 mL) pen injector Inject 2 mg under the skin every 7 days. 9 mL 3     No current facility-administered medications for this visit.     Past Surgical History:   Procedure Laterality Date    CARPAL TUNNEL RELEASE  11/08/2016    Neuroplasty Median Nerve At Carpal Tunnel    DILATION AND CURETTAGE OF UTERUS  11/08/2016    Dilation And Curettage    OTHER SURGICAL HISTORY  02/07/2020    Root canal procedure    OTHER SURGICAL HISTORY  04/24/2019    Colonoscopy    OTHER SURGICAL HISTORY  04/24/2019    Cholecystectomy     Family History   Problem Relation Name Age of Onset    No Known Problems Mother      No Known Problems Father      Diabetes Other      Heart failure Other      Glaucoma Other      Coronary artery disease Other  "     Other (venous insuff.) Other        Social History     Tobacco Use    Smoking status: Never     Passive exposure: Never    Smokeless tobacco: Never   Vaping Use    Vaping status: Never Used   Substance Use Topics    Alcohol use: Yes     Alcohol/week: 4.0 standard drinks of alcohol     Types: 4 Glasses of wine per week    Drug use: Never        Objective     Visit Vitals  /78 (BP Location: Left arm, Patient Position: Sitting, BP Cuff Size: Adult)   Pulse 75   Temp 36 °C (96.8 °F) (Temporal)   Resp 14   Ht 1.651 m (5' 5\")   Wt 93.9 kg (207 lb)   SpO2 97%   BMI 34.45 kg/m²   OB Status Postmenopausal   Smoking Status Never   BSA 2.08 m²        Physical Exam  Constitutional:       Appearance: Normal appearance.   HENT:      Head: Normocephalic and atraumatic.   Eyes:      Extraocular Movements: Extraocular movements intact.      Pupils: Pupils are equal, round, and reactive to light.   Cardiovascular:      Rate and Rhythm: Normal rate and regular rhythm.   Pulmonary:      Effort: Pulmonary effort is normal.      Breath sounds: Normal breath sounds.   Abdominal:      General: Abdomen is flat. Bowel sounds are normal.      Palpations: Abdomen is soft.   Musculoskeletal:         General: Normal range of motion.   Skin:     General: Skin is warm and dry.      Capillary Refill: Capillary refill takes less than 2 seconds.   Neurological:      General: No focal deficit present.      Mental Status: She is alert and oriented to person, place, and time.   Psychiatric:         Mood and Affect: Mood normal.         Behavior: Behavior normal.           Assessment/Plan   Problem List Items Addressed This Visit       Diabetes mellitus (Multi)     A1c further decreased since last visit now to 6.2% (3/2025) from 6.4% (12/2024)    Has self decreased metformin XR to 500mg daily due to blood sugars going low (80-90 fasting)  Will trial stopping metformin; continue Ozempic 2mg weekly and empagliflozin 10mg daily         Relevant " Medications    semaglutide (Ozempic) 2 mg/dose (8 mg/3 mL) pen injector    Other Relevant Orders    Hemoglobin A1C    CBC    Comprehensive Metabolic Panel    Hepatic steatosis     AST/ALT improved from last labs 44/78 --> 32/44    Encouraged to continue to refrain from alcohol use         Hypertension     Normotensive in office at 122/78  Has since discontinued amlodipine 5mg daily and has decreased lisinopril-hydrochlorothiazide to 20-12.5mg daily instead of twice daily    Continue lisinopril-hydrochlorothiazide 20-12.5mg daily with threshold to stop hydrochlorothiazide component if she becomes too hypotensive on this         Seasonal allergies    Relevant Medications    azelastine (Astelin) 137 mcg (0.1 %) nasal spray    Hyperlipidemia    Relevant Orders    Lipid Panel    Encounter for Medicare annual wellness exam     Wellness screenings/Immunizations:  Flu vaccination: Recommended annually  PCV: 1/2017  PPSV: 2/2022  Shingrix vaccine: Series complete  Colon cancer screening: Cologuard 3/2022, repeat study ordered. Aware colonoscopy will need to be diagnostic if cologuard is positive  Mammogram: 11/2024, WNL  DEXA scan: Last in 3/2024, Normal                Other Visit Diagnoses       Routine general medical examination at health care facility    -  Primary    Relevant Orders    1 Year Follow Up In Primary Care - Wellness Exam    Essential (primary) hypertension        Relevant Medications    lisinopriL-hydrochlorothiazide 20-12.5 mg tablet    metoprolol tartrate (Lopressor) 50 mg tablet    Colon cancer screening        Relevant Orders    Cologuard® colon cancer screening    Hypothyroidism, unspecified        Relevant Medications    levothyroxine (Synthroid, Levoxyl) 137 mcg tablet    Other Relevant Orders    TSH with reflex to Free T4 if abnormal    Type 2 diabetes mellitus without complications        Relevant Medications    atorvastatin (Lipitor) 40 mg tablet    Other Relevant Orders    Hemoglobin A1C    CBC     Comprehensive Metabolic Panel    Vaginal yeast infection        Vulvar atrophy        Relevant Medications    estradiol (Estrace) 0.01 % (0.1 mg/gram) vaginal cream            All pertinent lab work and results were reviewed with patient.     Follow up with me in 6 months    JOHN PAUL Akins-CNS

## 2025-04-01 NOTE — ASSESSMENT & PLAN NOTE
AST/ALT improved from last labs 44/78 --> 32/44    Encouraged to continue to refrain from alcohol use

## 2025-04-01 NOTE — ASSESSMENT & PLAN NOTE
A1c further decreased since last visit now to 6.2% (3/2025) from 6.4% (12/2024)    Has self decreased metformin XR to 500mg daily due to blood sugars going low (80-90 fasting)  Will trial stopping metformin; continue Ozempic 2mg weekly and empagliflozin 10mg daily

## 2025-04-12 LAB — NONINV COLON CA DNA+OCC BLD SCRN STL QL: NEGATIVE

## 2025-07-24 ENCOUNTER — OFFICE VISIT (OUTPATIENT)
Dept: OBSTETRICS AND GYNECOLOGY | Facility: CLINIC | Age: 70
End: 2025-07-24
Payer: MEDICARE

## 2025-07-24 VITALS — WEIGHT: 200.6 LBS | BODY MASS INDEX: 33.38 KG/M2 | DIASTOLIC BLOOD PRESSURE: 57 MMHG | SYSTOLIC BLOOD PRESSURE: 145 MMHG

## 2025-07-24 DIAGNOSIS — N90.4 VULVAR DYSTROPHY: ICD-10-CM

## 2025-07-24 DIAGNOSIS — L29.2 VULVAR PRURITUS: Primary | ICD-10-CM

## 2025-07-24 DIAGNOSIS — N89.8 VAGINAL DISCHARGE: ICD-10-CM

## 2025-07-24 PROCEDURE — 99203 OFFICE O/P NEW LOW 30 MIN: CPT | Performed by: NURSE PRACTITIONER

## 2025-07-24 PROCEDURE — 1036F TOBACCO NON-USER: CPT | Performed by: NURSE PRACTITIONER

## 2025-07-24 PROCEDURE — 3077F SYST BP >= 140 MM HG: CPT | Performed by: NURSE PRACTITIONER

## 2025-07-24 PROCEDURE — 1159F MED LIST DOCD IN RCRD: CPT | Performed by: NURSE PRACTITIONER

## 2025-07-24 PROCEDURE — 3078F DIAST BP <80 MM HG: CPT | Performed by: NURSE PRACTITIONER

## 2025-07-24 PROCEDURE — 99213 OFFICE O/P EST LOW 20 MIN: CPT | Performed by: NURSE PRACTITIONER

## 2025-07-24 PROCEDURE — 1160F RVW MEDS BY RX/DR IN RCRD: CPT | Performed by: NURSE PRACTITIONER

## 2025-07-24 RX ORDER — CLOBETASOL PROPIONATE 0.5 MG/G
OINTMENT TOPICAL 2 TIMES DAILY
Qty: 45 G | Refills: 2 | Status: SHIPPED | OUTPATIENT
Start: 2025-07-24

## 2025-07-24 NOTE — PATIENT INSTRUCTIONS
"Lichen sclerosus (LS) is a skin disorder that causes the skin to become thin, whitened, and wrinkled and can cause itching or pain.  LS can develop on any skin surface, but in females, it most commonly occurs near the clitoris, on the labia (the inner and outer genital lips), and in the anal region.  This is called \"vulvar LS.\" In a minority of patients, LS lesions develop on other skin surfaces, such as the thighs, breasts, wrists, shoulders, or neck.    Vulvar LS usually occurs in postmenopausal females, although premenopausal females and children may be affected. It is not clear exactly how many people have vulvar LS. Estimates for vulvar LS vary from 1 in 30 older adult females seen in general gynecology offices to 1 in 300 to 1000 patients referred to dermatologists.    CAUSES AND RISK FACTORS   The cause of vulvar lichen sclerosus (LS) is not clear; health care providers suspect that a number of factors may be involved.  ?Genetic factors - LS seems to be more common in some families. However, the role of genetics in LS is not fully understood.  ?Disorders of the immune system - LS in females may be an autoimmune disorder, in which the body's immune system mistakenly attacks and injures the skin. Adult females with LS are at greater risk of developing other autoimmune disorders, such as some types of thyroid disease, anemia, diabetes, alopecia areata, and vitiligo.  ?Infections - Researchers have not been able to clearly demonstrate any relationship between infections and LS. LS is not contagious.  ?Hormones - Vulvar LS is more common in prepubertal females and postmenopausal females, suggesting that hormonal changes influence the disease. However, treatments such as hormone replacement therapy or the application of testosterone or progesterone have not been shown to be effective for females with LS.  ?Skin injury - People with vulvar LS sometimes develop symptoms in places where the skin has been injured, such " as from physical trauma or sexual abuse.  ?Urine - There is evidence that urine may contribute to genital LS in males, in that microscopic droplets of urine may pool between the glans penis and the foreskin, contributing to LS in uncircumcised males. Further research is needed to determine whether urine contributes to more severe or difficult-to-control vulvar LS in females.    SIGNS AND SYMPTOMS   Features of vulvar lichen sclerosus -- Some people with vulvar lichen sclerosus (LS) feel dull, painful discomfort in the vulva, while others have no symptoms. The most common symptoms include:  ?Vulvar itching - The most common symptom of vulvar LS is itching. It may be so severe that it interferes with sleep.  ?Anal itching, fissures, bleeding, and pain   ?Painful sexual intercourse (dyspareunia) - This can occur as a result of repeated cracking of the skin (fissuring) or from narrowing of the vaginal opening due to scarring.  Typically, females with vulvar LS have thin, white, wrinkled skin on the labia, often extending down and around the anus. Sometimes, however, the skin can actually appear thickened and rough, like a callus. Purple-colored areas of bruising below the skin may be seen. Cracks (also known as fissures) may form in the skin in the area around the anus, the labia, and the clitoris. Relatively minor rubbing or sex may lead to bleeding due to the fragility of the involved skin.  Vulvar LS may progress and change the appearance of the genital area as the outer and inner lips of the vulva fuse (stick together) and cover the clitoris. The opening of the vagina can become narrowed, and cracks, fissures, and thickened, scarred skin in the genital and anal area can make sexual intercourse or genital examination painful. LS does not affect the inner reproductive organs, such as the vagina and uterus.    DIAGNOSIS   Providers typically use the following methods to diagnose vulvar lichen sclerosus (LS).  History  "and physical examination -- A medical history and physical examination of the vulvar and anal areas will be done, looking for the signs and symptoms of vulvar LS. A general skin examination may also be performed to exclude LS elsewhere on the body.  Biopsy -- A skin biopsy can be taken to confirm a suspected diagnosis of vulvar LS; however, this is not always required. A small piece of the affected skin will be removed and sent to a pathologist to be examined with a microscope.  Excluding other conditions -- An examination or tests may be done to exclude other conditions that could cause symptoms similar to those of vulvar LS, such as:  ?Lichen planus - Lichen planus is a skin disease that can also cause itching, pain, and fusing of genital skin. Lichen planus can occur together with LS, when it is called lichen sclerous/lichen planus overlap.  ?Low estrogen level - Vulvar changes related to a low estrogen level can occur alongside vulvar LS as both may develop in solomon- or postmenopausal females. A lack of the hormone estrogen can contribute to painful intercourse and, in rare cases, can cause fusing of genital skin. (See \"Patient education: Vaginal dryness (Beyond the Basics)\".)  ?Vitiligo - Vitiligo is a disorder that can cause white skin patches similar to those of LS. Vitiligo can occur together with LS.  ?Vulvar dermatitis - Vulvar dermatitis is an itchy skin disorder that can cause severe itch, fissuring, thickening of the skin, and lightening or darkening of the skin. However, vulvar dermatitis does not cause white skin patches or scarring.  ?Pemphigoid - Pemphigoid is a blistering skin disorder that also causes scarring of the vulva. This condition is rare.  ?Infections - Infections can cause pruritus or discomfort but usually do not cause the typical skin changes of LS. However, infection can occur together with LS.    VULVAR LICHEN SCLEROSUS AND CANCER   Adults with vulvar lichen sclerosus (LS) are at a " slightly increased risk for developing squamous cell skin cancer of the vulva.  Diagnosing vulvar LS early, treating it effectively, and biopsying any abnormal areas may help to reduce the risk of developing or missing a diagnosis of skin cancer. A once-yearly examination of the skin of the vulva is recommended, and women should examine themselves regularly (for example, monthly) for lumps or sores that do not heal. A biopsy should be performed if there are areas that do not improve with treatment. There is evidence that suggests that good control of vulvar LS may reduce the risk of vulval cancer.  LS lesions outside the genital area do not have an increased risk of cancer.    PAINFUL SEXUAL INTERCOURSE   Vulvar lichen sclerosus (LS) can lead to constriction of the vaginal opening and pain during sexual intercourse. People who experience pain during sex first require treatment to suppress any active disease.  Once the disease is controlled, some clinicians may recommend an estrogen cream to help to soften the skin around the vaginal opening. Devices called vaginal dilators, which patients can use at home, also may be used to slowly stretch the skin. Self-massage and dilator therapy may help to reduce pain with sexual intercourse. Rarely, surgical treatment is needed.  Pain with intercourse can also occur from other causes. Patients who notice pain during intercourse should discuss their symptoms with their health care providers.  LICHEN SCLEROSUS TREATMENT   The goals of treatment of vulvar lichen sclerosus (LS) are to relieve bothersome symptoms and to prevent the condition from worsening. A clinician may recommend medication for the physical symptoms and may refer the patient for support and therapy for other issues associated with the condition, such as problems with sex.  All patients with vulvar LS, even those without noticeable symptoms, need to use medication on a regular and ongoing basis. Patients also  "should see a health care provider for re-evaluation of the disease at least once or twice yearly.  Disease education -- Patients who are diagnosed with vulvar LS should talk to their clinician about:  ?The lifelong and potentially progressive nature of vulvar LS; appropriate treatment can stop the condition from worsening.  ?Ways to manage the condition.  ?The slightly increased risk of vulvar cancer and the need for ongoing monitoring.  ?How to keep the genital area healthy and avoid scratching .  ?Persistent pain with intercourse.  ?Good vulval hygiene, including avoidance of irritant products (eg, soaps, douches, and body washes) and the use of a bland emollient (moisturizer).  Medications -- Depending on the severity of the condition, a health care provider may recommend one or more of the following treatments for vulvar LS.  Topical steroids and steroid injections -- Steroid ointments reduce inflammation and itching. They are the treatment of choice for genital LS. Strong steroid ointments (eg, clobetasol propionate) are the mainstay of treatment for vulvar LS and are effective in most patients.  Initial treatment usually requires daily application of the steroid ointment for a few months to resolve the symptoms and reduce inflammation. After the initial course, most patients require \"maintenance\" therapy with less frequent application of a steroid ointment.  Although there may be warnings on the steroid product about the use of topical steroids on genital skin, it is important to use an adequate amount to bring the disease under control. The health care provider will provide guidance about the amount to use and frequency of application.  Steroid injections are sometimes helpful when steroid ointments are not effective.  Other medications -- Another class of topical medications are the calcineurin inhibitors (eg, tacrolimus or pimecrolimus), which are sometimes prescribed for patients who respond poorly to " steroids or cannot tolerate steroid treatment.  An oral medication called acitretin has also been used for the treatment of LS. Because it has many side effects, the drug is used primarily in patients who have not been helped by other treatments. Acitretin can cause severe birth defects, and people treated with acitretin should not get pregnant during treatment or for three years after taking the drug. For this reason, acitretin usually is not recommended for people who can become pregnant.  Surgery -- Some people with vulvar LS may develop abnormal fusion of the labia and/or scarring. Vaginal dilators can be used in this situation to stretch the skin to help restore normal function. Surgery may also be used in this situation. It is important to continue medical treatment (with steroid ointments) and dilators after surgery to prevent the recurrence of scarring.    WHAT TO EXPECT   The good news for patients who have been diagnosed with vulvar lichen sclerosus (LS) is that treatments such as topical steroid ointments are very effective. Thus, early treatment of vulvar LS with topical steroids can prevent scarring. Follow-up is important throughout the patient's lifetime.

## 2025-07-24 NOTE — PROGRESS NOTES
Alma Rosa is a 70 year old female  who presents for vulvar pruritis, burning and no discharge or odor for 7 months.  Pt new to this practice  She has h/o DM2, on ozempic for past year and also juardiance, having recurrent vaginal symptoms/yeast infections. Started juardiance in January, symptoms since then. Was unable to tolerate metformin  Did try course of monistat but not really helping  She is on vaginal estrogen, using twice weekly, for past 2-3 months  , together with  since 19yo. Pt is a retired RN from .    Vaginal discharge: none, no odor   Itching: all vulvar  Dyspareunia: yes, using lubricants, after it feels abraisons  Fever/chills: no  Abdominal pain: no  Bladder: Negative for dysuria or frequency  Bowel: No blood in stool, pain with BM, tarry stool, persistent diarrhea or constipation  Any new sexual partners or concern for STD exposure: no  Any history of STDs: no  Does your partner have any new complaints: not really, will get redness and sees derm for years for this, not related to her symptoms  Are you currently taking any medications to treat vaginitis: cortisone cream, helps some but afraid to use too much  Do you use feminine sprays, douches or deodorants: no  Menopause: age 43 AUB, s/p ablation and no bleeding since . Never HRT  Last pap: 2019 normal      Assessment:  /57   Wt 91 kg (200 lb 9.6 oz)   BMI 33.38 kg/m²   GENERAL: Well developed, well nourished, in no apparent distress  ABDOMEN: soft, non-tender and no masses  PELVIC: external genitalia normal, normal Bartholin's glands, urethra, [place]'s glands, no vulvar lesions, no cervical lesions, good vaginal support, physiologic discharge present, normal appearing perineal body and perianal region, well estrogenized*  BIMANUAL: uterus normal size, shape and consistency, no adnexal masses, non-tender and no cervical motion tenderness.  RECTOVAGINAL: rectovaginal exam negative for any masses or nodularity.  deferred.    Plan:  Vaginitis: culture obtained, will treat as indicated   Suspect possible yeast infection, but low suspicion given symptoms  STD screening: declines, no concerns  Any new medications given to the patient have been explained as to directions, reasons for prescribing and side effects. 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 Start clobetasol ointment to affected area BID x 6 weeks, then follow-up for skin check. Written info given to pt and discussed at length. No indication for biopsy at this time.

## 2025-07-25 LAB — BV SCORE VAG QL: NORMAL

## 2025-09-03 ENCOUNTER — OFFICE VISIT (OUTPATIENT)
Dept: OBSTETRICS AND GYNECOLOGY | Facility: CLINIC | Age: 70
End: 2025-09-03
Payer: MEDICARE

## 2025-09-03 VITALS — WEIGHT: 197 LBS | BODY MASS INDEX: 32.78 KG/M2 | SYSTOLIC BLOOD PRESSURE: 150 MMHG | DIASTOLIC BLOOD PRESSURE: 78 MMHG

## 2025-09-03 DIAGNOSIS — L29.2 VULVAR PRURITUS: ICD-10-CM

## 2025-09-03 DIAGNOSIS — N90.4 VULVAR DYSTROPHY: Primary | ICD-10-CM

## 2025-09-03 PROCEDURE — 1160F RVW MEDS BY RX/DR IN RCRD: CPT | Performed by: NURSE PRACTITIONER

## 2025-09-03 PROCEDURE — 99213 OFFICE O/P EST LOW 20 MIN: CPT | Performed by: NURSE PRACTITIONER

## 2025-09-03 PROCEDURE — 3078F DIAST BP <80 MM HG: CPT | Performed by: NURSE PRACTITIONER

## 2025-09-03 PROCEDURE — 1159F MED LIST DOCD IN RCRD: CPT | Performed by: NURSE PRACTITIONER

## 2025-09-03 PROCEDURE — 3077F SYST BP >= 140 MM HG: CPT | Performed by: NURSE PRACTITIONER

## 2025-09-03 PROCEDURE — 1036F TOBACCO NON-USER: CPT | Performed by: NURSE PRACTITIONER

## 2025-09-03 PROCEDURE — 99211 OFF/OP EST MAY X REQ PHY/QHP: CPT

## 2025-09-03 RX ORDER — FLUCONAZOLE 150 MG/1
150 TABLET ORAL ONCE
Qty: 2 TABLET | Refills: 0 | Status: SHIPPED | OUTPATIENT
Start: 2025-09-03 | End: 2025-09-09

## 2025-09-04 ENCOUNTER — APPOINTMENT (OUTPATIENT)
Dept: OBSTETRICS AND GYNECOLOGY | Facility: CLINIC | Age: 70
End: 2025-09-04
Payer: MEDICARE

## 2025-10-01 ENCOUNTER — APPOINTMENT (OUTPATIENT)
Dept: PRIMARY CARE | Facility: CLINIC | Age: 70
End: 2025-10-01
Payer: MEDICARE

## 2025-12-03 ENCOUNTER — APPOINTMENT (OUTPATIENT)
Dept: OBSTETRICS AND GYNECOLOGY | Facility: CLINIC | Age: 70
End: 2025-12-03
Payer: MEDICARE

## 2026-04-01 ENCOUNTER — APPOINTMENT (OUTPATIENT)
Dept: PRIMARY CARE | Facility: CLINIC | Age: 71
End: 2026-04-01
Payer: MEDICARE